# Patient Record
Sex: FEMALE | Race: BLACK OR AFRICAN AMERICAN | NOT HISPANIC OR LATINO | Employment: UNEMPLOYED | RURAL
[De-identification: names, ages, dates, MRNs, and addresses within clinical notes are randomized per-mention and may not be internally consistent; named-entity substitution may affect disease eponyms.]

---

## 2021-04-15 RX ORDER — METFORMIN HYDROCHLORIDE 500 MG/1
500 TABLET ORAL 2 TIMES DAILY WITH MEALS
Qty: 60 TABLET | Refills: 0 | Status: SHIPPED | OUTPATIENT
Start: 2021-04-15 | End: 2022-05-23

## 2021-04-15 RX ORDER — TRIAMTERENE/HYDROCHLOROTHIAZID 37.5-25 MG
1 TABLET ORAL EVERY MORNING
COMMUNITY
End: 2021-04-15 | Stop reason: SDUPTHER

## 2021-04-15 RX ORDER — METFORMIN HYDROCHLORIDE 500 MG/1
500 TABLET ORAL 2 TIMES DAILY WITH MEALS
COMMUNITY
End: 2021-04-15 | Stop reason: SDUPTHER

## 2021-04-15 RX ORDER — TRIAMTERENE/HYDROCHLOROTHIAZID 37.5-25 MG
1 TABLET ORAL EVERY MORNING
Qty: 30 TABLET | Refills: 0 | Status: SHIPPED | OUTPATIENT
Start: 2021-04-15 | End: 2021-04-26 | Stop reason: SDUPTHER

## 2021-04-26 RX ORDER — BENZTROPINE MESYLATE 0.5 MG/1
0.5 TABLET ORAL DAILY
COMMUNITY
Start: 2021-04-08

## 2021-04-26 RX ORDER — TRAZODONE HYDROCHLORIDE 150 MG/1
150 TABLET ORAL NIGHTLY PRN
COMMUNITY
Start: 2021-04-08 | End: 2023-09-18

## 2021-04-26 RX ORDER — LORATADINE 10 MG/1
10 TABLET ORAL DAILY
Qty: 30 TABLET | Refills: 0 | Status: SHIPPED | OUTPATIENT
Start: 2021-04-26 | End: 2021-05-26

## 2021-04-26 RX ORDER — ARIPIPRAZOLE 10 MG/1
10 TABLET ORAL DAILY
COMMUNITY
Start: 2021-04-08

## 2021-04-26 RX ORDER — LORATADINE 10 MG/1
10 TABLET ORAL DAILY
COMMUNITY
Start: 2021-03-14 | End: 2021-04-26 | Stop reason: SDUPTHER

## 2021-04-26 RX ORDER — VORTIOXETINE 10 MG/1
1 TABLET, FILM COATED ORAL DAILY
COMMUNITY
Start: 2021-04-08

## 2021-04-26 RX ORDER — TRIAMTERENE/HYDROCHLOROTHIAZID 37.5-25 MG
1 TABLET ORAL EVERY MORNING
Qty: 30 TABLET | Refills: 0 | Status: SHIPPED | OUTPATIENT
Start: 2021-04-26 | End: 2021-07-06 | Stop reason: SDUPTHER

## 2021-07-06 ENCOUNTER — OFFICE VISIT (OUTPATIENT)
Dept: FAMILY MEDICINE | Facility: CLINIC | Age: 31
End: 2021-07-06
Payer: MEDICAID

## 2021-07-06 VITALS
TEMPERATURE: 98 F | WEIGHT: 293 LBS | OXYGEN SATURATION: 100 % | RESPIRATION RATE: 18 BRPM | HEART RATE: 90 BPM | SYSTOLIC BLOOD PRESSURE: 148 MMHG | DIASTOLIC BLOOD PRESSURE: 101 MMHG | HEIGHT: 64 IN | BODY MASS INDEX: 50.02 KG/M2

## 2021-07-06 DIAGNOSIS — J30.2 SEASONAL ALLERGIES: ICD-10-CM

## 2021-07-06 DIAGNOSIS — N39.0 URINARY TRACT INFECTION WITHOUT HEMATURIA, SITE UNSPECIFIED: ICD-10-CM

## 2021-07-06 DIAGNOSIS — R30.0 DYSURIA: Primary | ICD-10-CM

## 2021-07-06 DIAGNOSIS — I10 ESSENTIAL HYPERTENSION, BENIGN: ICD-10-CM

## 2021-07-06 LAB
BILIRUB SERPL-MCNC: ABNORMAL MG/DL
BLOOD URINE, POC: ABNORMAL
COLOR, POC UA: ABNORMAL
GLUCOSE UR QL STRIP: ABNORMAL
KETONES UR QL STRIP: ABNORMAL
LEUKOCYTE ESTERASE URINE, POC: ABNORMAL
NITRITE, POC UA: ABNORMAL
PH, POC UA: 5.5
PROTEIN, POC: 30
SPECIFIC GRAVITY, POC UA: 1.03
UROBILINOGEN, POC UA: 0.2

## 2021-07-06 PROCEDURE — 99213 PR OFFICE/OUTPT VISIT, EST, LEVL III, 20-29 MIN: ICD-10-PCS | Mod: ,,, | Performed by: NURSE PRACTITIONER

## 2021-07-06 PROCEDURE — 99213 OFFICE O/P EST LOW 20 MIN: CPT | Mod: ,,, | Performed by: NURSE PRACTITIONER

## 2021-07-06 PROCEDURE — 81003 URINALYSIS AUTO W/O SCOPE: CPT | Mod: RHCUB | Performed by: NURSE PRACTITIONER

## 2021-07-06 PROCEDURE — 87086 URINE CULTURE/COLONY COUNT: CPT | Mod: ,,, | Performed by: CLINICAL MEDICAL LABORATORY

## 2021-07-06 PROCEDURE — 87086 CULTURE, URINE: ICD-10-PCS | Mod: ,,, | Performed by: CLINICAL MEDICAL LABORATORY

## 2021-07-06 RX ORDER — PHENAZOPYRIDINE HYDROCHLORIDE 100 MG/1
100 TABLET, FILM COATED ORAL 3 TIMES DAILY PRN
Qty: 10 TABLET | Refills: 0 | Status: SHIPPED | OUTPATIENT
Start: 2021-07-06 | End: 2021-07-16

## 2021-07-06 RX ORDER — LORATADINE 10 MG/1
10 TABLET ORAL DAILY
Qty: 30 TABLET | Refills: 2 | Status: SHIPPED | OUTPATIENT
Start: 2021-07-06 | End: 2024-01-22 | Stop reason: SDUPTHER

## 2021-07-06 RX ORDER — NITROFURANTOIN 25; 75 MG/1; MG/1
100 CAPSULE ORAL 2 TIMES DAILY
Qty: 14 CAPSULE | Refills: 0 | Status: SHIPPED | OUTPATIENT
Start: 2021-07-06 | End: 2023-08-07

## 2021-07-06 RX ORDER — FLUCONAZOLE 150 MG/1
150 TABLET ORAL WEEKLY
Qty: 2 TABLET | Refills: 0 | Status: SHIPPED | OUTPATIENT
Start: 2021-07-06 | End: 2021-07-07

## 2021-07-06 RX ORDER — LORATADINE 10 MG/1
TABLET ORAL
COMMUNITY
Start: 2021-06-02 | End: 2021-07-06 | Stop reason: SDUPTHER

## 2021-07-06 RX ORDER — TRIAMTERENE/HYDROCHLOROTHIAZID 37.5-25 MG
1 TABLET ORAL EVERY MORNING
Qty: 30 TABLET | Refills: 2 | Status: SHIPPED | OUTPATIENT
Start: 2021-07-06 | End: 2022-05-23

## 2021-07-08 LAB — UA COMPLETE W REFLEX CULTURE PNL UR: NORMAL

## 2022-03-08 ENCOUNTER — OFFICE VISIT (OUTPATIENT)
Dept: FAMILY MEDICINE | Facility: CLINIC | Age: 32
End: 2022-03-08
Payer: MEDICAID

## 2022-03-08 VITALS
DIASTOLIC BLOOD PRESSURE: 92 MMHG | WEIGHT: 293 LBS | OXYGEN SATURATION: 97 % | TEMPERATURE: 98 F | HEART RATE: 116 BPM | RESPIRATION RATE: 18 BRPM | HEIGHT: 63 IN | BODY MASS INDEX: 51.91 KG/M2 | SYSTOLIC BLOOD PRESSURE: 141 MMHG

## 2022-03-08 DIAGNOSIS — J03.00 STREP TONSILLITIS: Primary | ICD-10-CM

## 2022-03-08 DIAGNOSIS — J02.9 SORE THROAT: ICD-10-CM

## 2022-03-08 LAB
CTP QC/QA: YES
S PYO RRNA THROAT QL PROBE: NEGATIVE

## 2022-03-08 PROCEDURE — 96372 PR INJECTION,THERAP/PROPH/DIAG2ST, IM OR SUBCUT: ICD-10-PCS | Mod: ,,, | Performed by: NURSE PRACTITIONER

## 2022-03-08 PROCEDURE — 3077F SYST BP >= 140 MM HG: CPT | Mod: CPTII,,, | Performed by: NURSE PRACTITIONER

## 2022-03-08 PROCEDURE — 1160F PR REVIEW ALL MEDS BY PRESCRIBER/CLIN PHARMACIST DOCUMENTED: ICD-10-PCS | Mod: CPTII,,, | Performed by: NURSE PRACTITIONER

## 2022-03-08 PROCEDURE — 99213 PR OFFICE/OUTPT VISIT, EST, LEVL III, 20-29 MIN: ICD-10-PCS | Mod: 25,,, | Performed by: NURSE PRACTITIONER

## 2022-03-08 PROCEDURE — 3008F BODY MASS INDEX DOCD: CPT | Mod: CPTII,,, | Performed by: NURSE PRACTITIONER

## 2022-03-08 PROCEDURE — 3080F PR MOST RECENT DIASTOLIC BLOOD PRESSURE >= 90 MM HG: ICD-10-PCS | Mod: CPTII,,, | Performed by: NURSE PRACTITIONER

## 2022-03-08 PROCEDURE — 3077F PR MOST RECENT SYSTOLIC BLOOD PRESSURE >= 140 MM HG: ICD-10-PCS | Mod: CPTII,,, | Performed by: NURSE PRACTITIONER

## 2022-03-08 PROCEDURE — 99213 OFFICE O/P EST LOW 20 MIN: CPT | Mod: 25,,, | Performed by: NURSE PRACTITIONER

## 2022-03-08 PROCEDURE — 1159F MED LIST DOCD IN RCRD: CPT | Mod: CPTII,,, | Performed by: NURSE PRACTITIONER

## 2022-03-08 PROCEDURE — 3080F DIAST BP >= 90 MM HG: CPT | Mod: CPTII,,, | Performed by: NURSE PRACTITIONER

## 2022-03-08 PROCEDURE — 1159F PR MEDICATION LIST DOCUMENTED IN MEDICAL RECORD: ICD-10-PCS | Mod: CPTII,,, | Performed by: NURSE PRACTITIONER

## 2022-03-08 PROCEDURE — 1160F RVW MEDS BY RX/DR IN RCRD: CPT | Mod: CPTII,,, | Performed by: NURSE PRACTITIONER

## 2022-03-08 PROCEDURE — 3008F PR BODY MASS INDEX (BMI) DOCUMENTED: ICD-10-PCS | Mod: CPTII,,, | Performed by: NURSE PRACTITIONER

## 2022-03-08 PROCEDURE — 87880 STREP A ASSAY W/OPTIC: CPT | Mod: RHCUB | Performed by: NURSE PRACTITIONER

## 2022-03-08 PROCEDURE — 96372 THER/PROPH/DIAG INJ SC/IM: CPT | Mod: ,,, | Performed by: NURSE PRACTITIONER

## 2022-03-08 RX ORDER — CEFTRIAXONE 1 G/1
1 INJECTION, POWDER, FOR SOLUTION INTRAMUSCULAR; INTRAVENOUS
Status: COMPLETED | OUTPATIENT
Start: 2022-03-08 | End: 2022-03-08

## 2022-03-08 RX ORDER — HYDROCODONE BITARTRATE AND ACETAMINOPHEN 10; 325 MG/1; MG/1
1 TABLET ORAL EVERY 6 HOURS PRN
Qty: 12 TABLET | Refills: 0 | Status: SHIPPED | OUTPATIENT
Start: 2022-03-08 | End: 2022-03-20

## 2022-03-08 RX ORDER — KETOROLAC TROMETHAMINE 30 MG/ML
60 INJECTION, SOLUTION INTRAMUSCULAR; INTRAVENOUS
Status: COMPLETED | OUTPATIENT
Start: 2022-03-08 | End: 2022-03-08

## 2022-03-08 RX ORDER — AMOXICILLIN 875 MG/1
875 TABLET, FILM COATED ORAL EVERY 12 HOURS
Qty: 20 TABLET | Refills: 0 | Status: SHIPPED | OUTPATIENT
Start: 2022-03-08 | End: 2023-08-07

## 2022-03-08 RX ADMIN — CEFTRIAXONE 1 G: 1 INJECTION, POWDER, FOR SOLUTION INTRAMUSCULAR; INTRAVENOUS at 09:03

## 2022-03-08 RX ADMIN — KETOROLAC TROMETHAMINE 60 MG: 30 INJECTION, SOLUTION INTRAMUSCULAR; INTRAVENOUS at 09:03

## 2022-03-08 NOTE — PROGRESS NOTES
"Subjective:       Patient ID: Adriana Deleon is a 31 y.o. female.    Chief Complaint: Sore Throat (Sore throat states it is hard to swallow times 2 days)    Presents to clinic as above. No fever. Severe sore throat. Also c/o ear pain.     Review of Systems   Constitutional: Positive for malaise/fatigue. Negative for chills and fever.   HENT: Positive for ear pain and sore throat. Negative for congestion and ear discharge.    Respiratory: Negative.    Cardiovascular: Negative.    Gastrointestinal: Negative.    Genitourinary: Negative.    Musculoskeletal: Negative.    Neurological: Positive for headaches.          Reviewed family, medical, surgical, and social history.    Objective:      BP (!) 141/92   Pulse (!) 116   Temp 98.1 °F (36.7 °C)   Resp 18   Ht 5' 3" (1.6 m)   Wt (!) 161 kg (355 lb)   SpO2 97%   BMI 62.89 kg/m²   Physical Exam  Vitals and nursing note reviewed.   Constitutional:       General: She is not in acute distress.     Appearance: Normal appearance. She is not ill-appearing or diaphoretic.   HENT:      Head: Normocephalic.      Right Ear: Tympanic membrane, ear canal and external ear normal.      Left Ear: Ear canal and external ear normal. Tympanic membrane is erythematous.      Nose: Nose normal.      Mouth/Throat:      Mouth: Mucous membranes are moist.      Pharynx: Uvula midline. Pharyngeal swelling, oropharyngeal exudate and posterior oropharyngeal erythema present. No uvula swelling.      Tonsils: Tonsillar exudate present. 3+ on the right. 3+ on the left.   Cardiovascular:      Rate and Rhythm: Normal rate and regular rhythm.      Pulses: Normal pulses.      Heart sounds: Normal heart sounds.   Pulmonary:      Effort: Pulmonary effort is normal.      Breath sounds: Normal breath sounds.   Musculoskeletal:      Cervical back: Normal range of motion and neck supple.   Skin:     General: Skin is warm and dry.      Capillary Refill: Capillary refill takes less than 2 seconds. " Vital signs stable. Ambulates across room unaided to bathroom to get dressed. Gait steady.   Neurological:      General: No focal deficit present.      Mental Status: She is alert and oriented to person, place, and time.   Psychiatric:         Mood and Affect: Mood normal.         Behavior: Behavior normal.         Thought Content: Thought content normal.         Judgment: Judgment normal.            No visits with results within 1 Day(s) from this visit.   Latest known visit with results is:   Office Visit on 07/06/2021   Component Date Value Ref Range Status    Color, UA 07/06/2021 Dark Yellow   Final    CLARITY CLOUDY    Spec Grav UA 07/06/2021 1.030   Final    pH, UA 07/06/2021 5.5   Final    WBC, UA 07/06/2021 TRACE   Final    Nitrite, UA 07/06/2021 NEG   Final    Protein, POC 07/06/2021 30   Final    Glucose, UA 07/06/2021 NEG   Final    Ketones, UA 07/06/2021 NEG   Final    Bilirubin, POC 07/06/2021 SMALL   Final    Urobilinogen, UA 07/06/2021 0.2   Final    Blood, UA 07/06/2021 TRACE   Final    Culture, Urine 07/06/2021 Skin/Urogenital [place] Isolated, no further workup.   Final      Assessment:       1. Strep tonsillitis    2. Sore throat        Plan:       Strep tonsillitis  -     POCT rapid strep A  -     cefTRIAXone injection 1 g  -     ketorolac injection 60 mg  -     amoxicillin (AMOXIL) 875 MG tablet; Take 1 tablet (875 mg total) by mouth every 12 (twelve) hours.  Dispense: 20 tablet; Refill: 0  -     HYDROcodone-acetaminophen (NORCO)  mg per tablet; Take 1 tablet by mouth every 6 (six) hours as needed for Pain (Tonsilitis pain).  Dispense: 12 tablet; Refill: 0    Sore throat  -     POCT rapid strep A    Drink plenty of fluids  GO to ER with any worsening.  RTC PRN          Risks, benefits, and side effects were discussed with the patient. All questions were answered to the fullest satisfaction of the patient, and pt verbalized understanding and agreement to treatment plan. Pt was to call with any new or worsening symptoms, or present to the ER.

## 2022-05-23 ENCOUNTER — OFFICE VISIT (OUTPATIENT)
Dept: FAMILY MEDICINE | Facility: CLINIC | Age: 32
End: 2022-05-23
Payer: MEDICAID

## 2022-05-23 VITALS
TEMPERATURE: 99 F | WEIGHT: 293 LBS | HEART RATE: 113 BPM | HEIGHT: 63 IN | SYSTOLIC BLOOD PRESSURE: 113 MMHG | RESPIRATION RATE: 19 BRPM | DIASTOLIC BLOOD PRESSURE: 93 MMHG | BODY MASS INDEX: 51.91 KG/M2 | OXYGEN SATURATION: 100 %

## 2022-05-23 DIAGNOSIS — J02.9 SORE THROAT: ICD-10-CM

## 2022-05-23 DIAGNOSIS — J02.9 PHARYNGITIS, UNSPECIFIED ETIOLOGY: Primary | ICD-10-CM

## 2022-05-23 LAB
CTP QC/QA: YES
S PYO RRNA THROAT QL PROBE: NEGATIVE

## 2022-05-23 PROCEDURE — 3008F BODY MASS INDEX DOCD: CPT | Mod: CPTII,,, | Performed by: FAMILY MEDICINE

## 2022-05-23 PROCEDURE — 3008F PR BODY MASS INDEX (BMI) DOCUMENTED: ICD-10-PCS | Mod: CPTII,,, | Performed by: FAMILY MEDICINE

## 2022-05-23 PROCEDURE — 3080F PR MOST RECENT DIASTOLIC BLOOD PRESSURE >= 90 MM HG: ICD-10-PCS | Mod: CPTII,,, | Performed by: FAMILY MEDICINE

## 2022-05-23 PROCEDURE — 3074F PR MOST RECENT SYSTOLIC BLOOD PRESSURE < 130 MM HG: ICD-10-PCS | Mod: CPTII,,, | Performed by: FAMILY MEDICINE

## 2022-05-23 PROCEDURE — 99214 PR OFFICE/OUTPT VISIT, EST, LEVL IV, 30-39 MIN: ICD-10-PCS | Mod: 25,,, | Performed by: FAMILY MEDICINE

## 2022-05-23 PROCEDURE — 1160F RVW MEDS BY RX/DR IN RCRD: CPT | Mod: CPTII,,, | Performed by: FAMILY MEDICINE

## 2022-05-23 PROCEDURE — 1160F PR REVIEW ALL MEDS BY PRESCRIBER/CLIN PHARMACIST DOCUMENTED: ICD-10-PCS | Mod: CPTII,,, | Performed by: FAMILY MEDICINE

## 2022-05-23 PROCEDURE — 96372 THER/PROPH/DIAG INJ SC/IM: CPT | Mod: ,,, | Performed by: FAMILY MEDICINE

## 2022-05-23 PROCEDURE — 3074F SYST BP LT 130 MM HG: CPT | Mod: CPTII,,, | Performed by: FAMILY MEDICINE

## 2022-05-23 PROCEDURE — 99214 OFFICE O/P EST MOD 30 MIN: CPT | Mod: 25,,, | Performed by: FAMILY MEDICINE

## 2022-05-23 PROCEDURE — 1159F MED LIST DOCD IN RCRD: CPT | Mod: CPTII,,, | Performed by: FAMILY MEDICINE

## 2022-05-23 PROCEDURE — 96372 PR INJECTION,THERAP/PROPH/DIAG2ST, IM OR SUBCUT: ICD-10-PCS | Mod: ,,, | Performed by: FAMILY MEDICINE

## 2022-05-23 PROCEDURE — 1159F PR MEDICATION LIST DOCUMENTED IN MEDICAL RECORD: ICD-10-PCS | Mod: CPTII,,, | Performed by: FAMILY MEDICINE

## 2022-05-23 PROCEDURE — 87880 STREP A ASSAY W/OPTIC: CPT | Mod: RHCUB | Performed by: FAMILY MEDICINE

## 2022-05-23 PROCEDURE — 3080F DIAST BP >= 90 MM HG: CPT | Mod: CPTII,,, | Performed by: FAMILY MEDICINE

## 2022-05-23 RX ORDER — DEXAMETHASONE SODIUM PHOSPHATE 4 MG/ML
3 INJECTION, SOLUTION INTRA-ARTICULAR; INTRALESIONAL; INTRAMUSCULAR; INTRAVENOUS; SOFT TISSUE
Status: COMPLETED | OUTPATIENT
Start: 2022-05-23 | End: 2022-05-23

## 2022-05-23 RX ORDER — PREDNISONE 20 MG/1
20 TABLET ORAL DAILY
Qty: 3 TABLET | Refills: 0 | Status: SHIPPED | OUTPATIENT
Start: 2022-05-23 | End: 2022-05-26

## 2022-05-23 RX ORDER — CEFTRIAXONE 1 G/1
1 INJECTION, POWDER, FOR SOLUTION INTRAMUSCULAR; INTRAVENOUS
Status: COMPLETED | OUTPATIENT
Start: 2022-05-23 | End: 2022-05-23

## 2022-05-23 RX ORDER — CLINDAMYCIN HYDROCHLORIDE 300 MG/1
300 CAPSULE ORAL 3 TIMES DAILY
Qty: 21 CAPSULE | Refills: 0 | Status: SHIPPED | OUTPATIENT
Start: 2022-05-23 | End: 2022-05-30

## 2022-05-23 RX ADMIN — CEFTRIAXONE 1 G: 1 INJECTION, POWDER, FOR SOLUTION INTRAMUSCULAR; INTRAVENOUS at 01:05

## 2022-05-23 RX ADMIN — DEXAMETHASONE SODIUM PHOSPHATE 3 MG: 4 INJECTION, SOLUTION INTRA-ARTICULAR; INTRALESIONAL; INTRAMUSCULAR; INTRAVENOUS; SOFT TISSUE at 01:05

## 2022-05-23 NOTE — PROGRESS NOTES
Subjective:       Patient ID: Adriana Deleon is a 31 y.o. female.    Chief Complaint: Sore Throat (Sore throat for a week)    Pt with sore throat, congestion, drainage x 1 week.     Review of Systems   Constitutional: Negative for activity change, appetite change, chills, diaphoresis, fatigue, fever and unexpected weight change.   HENT: Positive for nasal congestion, postnasal drip and sore throat. Negative for dental problem, drooling, ear discharge, ear pain, facial swelling, hearing loss, mouth sores, nosebleeds, rhinorrhea, sinus pressure/congestion, sneezing, tinnitus, trouble swallowing, voice change and goiter.    Eyes: Negative for photophobia, discharge, itching and visual disturbance.   Respiratory: Negative for apnea, cough, choking, chest tightness, shortness of breath, wheezing and stridor.    Cardiovascular: Negative for chest pain, palpitations, leg swelling and claudication.   Gastrointestinal: Negative for abdominal distention, abdominal pain, anal bleeding, blood in stool, change in bowel habit, constipation, diarrhea, nausea, vomiting and change in bowel habit.   Endocrine: Negative for cold intolerance, heat intolerance, polydipsia, polyphagia and polyuria.   Genitourinary: Negative for bladder incontinence, decreased urine volume, difficulty urinating, dysuria, enuresis, flank pain, frequency, hematuria, nocturia, pelvic pain and urgency.   Musculoskeletal: Negative for arthralgias, back pain, gait problem, joint swelling, leg pain, myalgias, neck pain, neck stiffness and joint deformity.   Integumentary:  Negative for pallor, rash, wound, breast mass and breast tenderness.   Allergic/Immunologic: Negative for environmental allergies, food allergies and immunocompromised state.   Neurological: Negative for dizziness, vertigo, tremors, seizures, syncope, facial asymmetry, speech difficulty, weakness, light-headedness, numbness, headaches, disturbances in coordination, memory loss and coordination  difficulties.   Hematological: Negative for adenopathy. Does not bruise/bleed easily.   Psychiatric/Behavioral: Negative for agitation, behavioral problems, confusion, decreased concentration, dysphoric mood, hallucinations, self-injury, sleep disturbance and suicidal ideas. The patient is not nervous/anxious and is not hyperactive.    Breast: Negative for mass and tenderness        Objective:      Physical Exam  Vitals reviewed.   Constitutional:       Appearance: Normal appearance.   HENT:      Head: Normocephalic and atraumatic.      Right Ear: Tympanic membrane, ear canal and external ear normal.      Left Ear: Tympanic membrane, ear canal and external ear normal.      Nose: Nose normal.      Mouth/Throat:      Mouth: Mucous membranes are moist.      Pharynx: Oropharyngeal exudate and posterior oropharyngeal erythema present.   Eyes:      Extraocular Movements: Extraocular movements intact.      Conjunctiva/sclera: Conjunctivae normal.      Pupils: Pupils are equal, round, and reactive to light.   Cardiovascular:      Rate and Rhythm: Normal rate and regular rhythm.      Pulses: Normal pulses.      Heart sounds: Normal heart sounds.   Pulmonary:      Effort: Pulmonary effort is normal.      Breath sounds: Normal breath sounds.   Abdominal:      General: Bowel sounds are normal.      Palpations: Abdomen is soft.   Musculoskeletal:         General: Normal range of motion.      Cervical back: Normal range of motion and neck supple.   Skin:     General: Skin is warm and dry.   Neurological:      General: No focal deficit present.      Mental Status: She is alert. Mental status is at baseline.   Psychiatric:         Mood and Affect: Mood normal.         Behavior: Behavior normal.         Thought Content: Thought content normal.         Judgment: Judgment normal.         Assessment:       1. Pharyngitis, unspecified etiology    2. Sore throat        Plan:     Pharyngitis, unspecified etiology  -     cefTRIAXone  injection 1 g  -     dexamethasone injection 3 mg    Sore throat  -     POCT rapid strep A    Other orders  -     clindamycin (CLEOCIN) 300 MG capsule; Take 1 capsule (300 mg total) by mouth 3 (three) times daily. for 7 days  Dispense: 21 capsule; Refill: 0  -     predniSONE (DELTASONE) 20 MG tablet; Take 1 tablet (20 mg total) by mouth once daily. for 3 days  Dispense: 3 tablet; Refill: 0       Strep negative, treat for nonstrep pharyngitis.

## 2022-12-01 ENCOUNTER — OFFICE VISIT (OUTPATIENT)
Dept: OBSTETRICS AND GYNECOLOGY | Facility: CLINIC | Age: 32
End: 2022-12-01
Payer: MEDICAID

## 2022-12-01 VITALS
HEIGHT: 64 IN | DIASTOLIC BLOOD PRESSURE: 90 MMHG | RESPIRATION RATE: 17 BRPM | BODY MASS INDEX: 50.02 KG/M2 | SYSTOLIC BLOOD PRESSURE: 131 MMHG | HEART RATE: 105 BPM | WEIGHT: 293 LBS | OXYGEN SATURATION: 99 %

## 2022-12-01 DIAGNOSIS — E66.01 MORBID OBESITY WITH BMI OF 60.0-69.9, ADULT: ICD-10-CM

## 2022-12-01 DIAGNOSIS — N93.9 VAGINAL BLEEDING: Primary | ICD-10-CM

## 2022-12-01 DIAGNOSIS — N93.8 DUB (DYSFUNCTIONAL UTERINE BLEEDING): ICD-10-CM

## 2022-12-01 DIAGNOSIS — Z20.2 ENCOUNTER FOR ASSESSMENT OF STD EXPOSURE: ICD-10-CM

## 2022-12-01 DIAGNOSIS — Z32.02 URINE PREGNANCY TEST NEGATIVE: ICD-10-CM

## 2022-12-01 LAB
B-HCG UR QL: NEGATIVE
CANDIDA SPECIES: NEGATIVE
CHLAMYDIA BY PCR: NEGATIVE
CTP QC/QA: YES
GARDNERELLA: POSITIVE
N. GONORRHOEAE (GC) BY PCR: NEGATIVE
TRICHOMONAS: NEGATIVE

## 2022-12-01 PROCEDURE — 1159F PR MEDICATION LIST DOCUMENTED IN MEDICAL RECORD: ICD-10-PCS | Mod: CPTII,,, | Performed by: ADVANCED PRACTICE MIDWIFE

## 2022-12-01 PROCEDURE — 96372 THER/PROPH/DIAG INJ SC/IM: CPT | Mod: ,,, | Performed by: ADVANCED PRACTICE MIDWIFE

## 2022-12-01 PROCEDURE — 87660 TRICHOMONAS VAGIN DIR PROBE: CPT | Mod: ,,, | Performed by: CLINICAL MEDICAL LABORATORY

## 2022-12-01 PROCEDURE — 3008F BODY MASS INDEX DOCD: CPT | Mod: CPTII,,, | Performed by: ADVANCED PRACTICE MIDWIFE

## 2022-12-01 PROCEDURE — 3075F SYST BP GE 130 - 139MM HG: CPT | Mod: CPTII,,, | Performed by: ADVANCED PRACTICE MIDWIFE

## 2022-12-01 PROCEDURE — 85018 HEMOGLOBIN AND HEMATOCRIT, BLOOD: ICD-10-PCS | Mod: ,,, | Performed by: CLINICAL MEDICAL LABORATORY

## 2022-12-01 PROCEDURE — 87491 CHLMYD TRACH DNA AMP PROBE: CPT | Mod: ,,, | Performed by: CLINICAL MEDICAL LABORATORY

## 2022-12-01 PROCEDURE — 3080F DIAST BP >= 90 MM HG: CPT | Mod: CPTII,,, | Performed by: ADVANCED PRACTICE MIDWIFE

## 2022-12-01 PROCEDURE — 85018 HEMOGLOBIN: CPT | Mod: ,,, | Performed by: CLINICAL MEDICAL LABORATORY

## 2022-12-01 PROCEDURE — 87480 CANDIDA DNA DIR PROBE: CPT | Mod: ,,, | Performed by: CLINICAL MEDICAL LABORATORY

## 2022-12-01 PROCEDURE — 87660 BACTERIAL VAGINOSIS: ICD-10-PCS | Mod: ,,, | Performed by: CLINICAL MEDICAL LABORATORY

## 2022-12-01 PROCEDURE — 99202 OFFICE O/P NEW SF 15 MIN: CPT | Mod: 25,,, | Performed by: ADVANCED PRACTICE MIDWIFE

## 2022-12-01 PROCEDURE — 85014 HEMATOCRIT: CPT | Mod: ,,, | Performed by: CLINICAL MEDICAL LABORATORY

## 2022-12-01 PROCEDURE — 85014 HEMOGLOBIN AND HEMATOCRIT, BLOOD: ICD-10-PCS | Mod: ,,, | Performed by: CLINICAL MEDICAL LABORATORY

## 2022-12-01 PROCEDURE — 87510 BACTERIAL VAGINOSIS: ICD-10-PCS | Mod: ,,, | Performed by: CLINICAL MEDICAL LABORATORY

## 2022-12-01 PROCEDURE — 3075F PR MOST RECENT SYSTOLIC BLOOD PRESS GE 130-139MM HG: ICD-10-PCS | Mod: CPTII,,, | Performed by: ADVANCED PRACTICE MIDWIFE

## 2022-12-01 PROCEDURE — 96372 PR INJECTION,THERAP/PROPH/DIAG2ST, IM OR SUBCUT: ICD-10-PCS | Mod: ,,, | Performed by: ADVANCED PRACTICE MIDWIFE

## 2022-12-01 PROCEDURE — 87591 N.GONORRHOEAE DNA AMP PROB: CPT | Mod: ,,, | Performed by: CLINICAL MEDICAL LABORATORY

## 2022-12-01 PROCEDURE — 81025 URINE PREGNANCY TEST: CPT | Mod: RHCUB | Performed by: ADVANCED PRACTICE MIDWIFE

## 2022-12-01 PROCEDURE — 87491 CHLAMYDIA/GONORRHOEAE(GC), PCR: ICD-10-PCS | Mod: ,,, | Performed by: CLINICAL MEDICAL LABORATORY

## 2022-12-01 PROCEDURE — 1159F MED LIST DOCD IN RCRD: CPT | Mod: CPTII,,, | Performed by: ADVANCED PRACTICE MIDWIFE

## 2022-12-01 PROCEDURE — 87591 CHLAMYDIA/GONORRHOEAE(GC), PCR: ICD-10-PCS | Mod: ,,, | Performed by: CLINICAL MEDICAL LABORATORY

## 2022-12-01 PROCEDURE — 99202 PR OFFICE/OUTPT VISIT, NEW, LEVL II, 15-29 MIN: ICD-10-PCS | Mod: 25,,, | Performed by: ADVANCED PRACTICE MIDWIFE

## 2022-12-01 PROCEDURE — 3008F PR BODY MASS INDEX (BMI) DOCUMENTED: ICD-10-PCS | Mod: CPTII,,, | Performed by: ADVANCED PRACTICE MIDWIFE

## 2022-12-01 PROCEDURE — 87480 BACTERIAL VAGINOSIS: ICD-10-PCS | Mod: ,,, | Performed by: CLINICAL MEDICAL LABORATORY

## 2022-12-01 PROCEDURE — 87510 GARDNER VAG DNA DIR PROBE: CPT | Mod: ,,, | Performed by: CLINICAL MEDICAL LABORATORY

## 2022-12-01 PROCEDURE — 3080F PR MOST RECENT DIASTOLIC BLOOD PRESSURE >= 90 MM HG: ICD-10-PCS | Mod: CPTII,,, | Performed by: ADVANCED PRACTICE MIDWIFE

## 2022-12-01 RX ORDER — METFORMIN HYDROCHLORIDE 500 MG/1
1 TABLET ORAL 2 TIMES DAILY
COMMUNITY
End: 2023-08-07 | Stop reason: SDUPTHER

## 2022-12-01 RX ORDER — IBUPROFEN 800 MG/1
800 TABLET ORAL EVERY 8 HOURS PRN
Qty: 60 TABLET | Refills: 2 | Status: SHIPPED | OUTPATIENT
Start: 2022-12-01 | End: 2023-08-07

## 2022-12-01 RX ORDER — HALOPERIDOL 10 MG/1
10 TABLET ORAL 2 TIMES DAILY
COMMUNITY

## 2022-12-01 RX ORDER — TRIAMTERENE/HYDROCHLOROTHIAZID 37.5-25 MG
1 TABLET ORAL DAILY
COMMUNITY

## 2022-12-01 RX ORDER — BUPROPION HYDROCHLORIDE 100 MG/1
100 TABLET, EXTENDED RELEASE ORAL 2 TIMES DAILY
COMMUNITY
Start: 2022-11-18

## 2022-12-01 RX ORDER — MEDROXYPROGESTERONE ACETATE 150 MG/ML
150 INJECTION, SUSPENSION INTRAMUSCULAR ONCE
Status: COMPLETED | OUTPATIENT
Start: 2022-12-01 | End: 2022-12-01

## 2022-12-01 RX ADMIN — MEDROXYPROGESTERONE ACETATE 150 MG: 150 INJECTION, SUSPENSION INTRAMUSCULAR at 11:12

## 2022-12-01 NOTE — PROGRESS NOTES
"Patient ID:  Adriana Deleon is a 31 y.o. female      Chief Complaint:   Chief Complaint   Patient presents with    Vaginal Bleeding     X 4 months with clots        HPI:  Adriana is in with c/o vaginal bleeding x 4 month with passage of clots. [place] [facility] [place], had been without menstrual cycle x 3 months at that time, reports cycle started last week in July with continuous bleeding since. Denies pain. Medical hx updated, takes numerous medications. Discussed hormonal contraceptive options for management of menorrhagia including POP, implant, IUD, and injection. POC discussed and agreed upon for lab, depo injection, and pelvic US.    LMP: No LMP recorded (lmp unknown).   Sexually active:  last encounter more than 1 year past  Contraceptive: none      Past Medical History:   Diagnosis Date    Diabetes mellitus, type 2     NENITA (generalized anxiety disorder)     H/O: depression     Hypertension     Sleep apnea, obstructive      Past Surgical History:   Procedure Laterality Date     SECTION  2017    [place]. [place]aleano[place] [facility] [place]       OB History          1    Para   1    Term                AB        Living   1         SAB        IAB        Ectopic        Multiple        Live Births   1                 BP (!) 131/90 (BP Location: Right arm)   Pulse 105   Resp 17   Ht 5' 4" (1.626 m)   Wt (!) 167.8 kg (370 lb)   LMP  (LMP Unknown)   SpO2 99%   BMI 63.51 kg/m²   Wt Readings from Last 3 Encounters:   22 (!) 167.8 kg (370 lb)   22 (!) 163.3 kg (360 lb)   22 (!) 161 kg (355 lb)          ROS:  Review of Systems   Constitutional: Negative.    Eyes: Negative.    Respiratory: Negative.     Cardiovascular: Negative.    Gastrointestinal: Negative.    Genitourinary:  Positive for menorrhagia and menstrual problem.   Musculoskeletal: Negative.    Neurological: Negative.    Psychiatric/Behavioral: Negative.          PHYSICAL EXAM:  Physical Exam  Constitutional:       Appearance: " Normal appearance. She is obese.   Eyes:      Extraocular Movements: Extraocular movements intact.   Cardiovascular:      Rate and Rhythm: Normal rate.      Pulses: Normal pulses.   Pulmonary:      Effort: Pulmonary effort is normal.   Abdominal:      Palpations: Abdomen is soft.   Musculoskeletal:         General: Normal range of motion.      Cervical back: Normal range of motion.   Skin:     General: Skin is warm and dry.   Neurological:      Mental Status: She is alert and oriented to person, place, and time.   Psychiatric:         Mood and Affect: Mood normal.         Behavior: Behavior normal.         Thought Content: Thought content normal.         Judgment: Judgment normal.        Assessment:  Adriana was seen today for vaginal bleeding.    Diagnoses and all orders for this visit:    Vaginal bleeding  -     POCT urine pregnancy  -     Hemoglobin and Hematocrit; Future  -     Hemoglobin and Hematocrit    DUB (dysfunctional uterine bleeding)  -     Hemoglobin and Hematocrit; Future  -     ibuprofen (ADVIL,MOTRIN) 800 MG tablet; Take 1 tablet (800 mg total) by mouth every 8 (eight) hours as needed for Pain. Take 1 every 8 hours for 7 days to manage bleeding.  -     US Pelvis Complete Non OB; Future  -     Bacterial Vaginosis; Future  -     Chlamydia/GC, PCR; Future  -     medroxyPROGESTERone (DEPO-PROVERA) syringe 150 mg  -     Bacterial Vaginosis  -     Chlamydia/GC, PCR    Encounter for assessment of STD exposure  -     Bacterial Vaginosis; Future  -     Chlamydia/GC, PCR; Future  -     Bacterial Vaginosis  -     Chlamydia/GC, PCR    Morbid obesity with BMI of 60.0-69.9, adult    Urine pregnancy test negative        ICD-10-CM ICD-9-CM    1. Vaginal bleeding  N93.9 623.8 POCT urine pregnancy      Hemoglobin and Hematocrit      Hemoglobin and Hematocrit      2. DUB (dysfunctional uterine bleeding)  N93.8 626.8 Hemoglobin and Hematocrit      ibuprofen (ADVIL,MOTRIN) 800 MG tablet      US Pelvis Complete Non OB       Bacterial Vaginosis      Chlamydia/GC, PCR      medroxyPROGESTERone (DEPO-PROVERA) syringe 150 mg      Bacterial Vaginosis      Chlamydia/GC, PCR      3. Encounter for assessment of STD exposure  Z76.89 V72.85 Bacterial Vaginosis      Chlamydia/GC, PCR      Bacterial Vaginosis      Chlamydia/GC, PCR      4. Morbid obesity with BMI of 60.0-69.9, adult  E66.01 278.01     Z68.44 V85.44       5. Urine pregnancy test negative  Z32.02 V72.41           Plan:  UPT negative  Affirm swab self collected  Urine sent for GC and chlamydia testing  Will call with results, encouraged use of My Chart for review of labs  Discussed management of menorrhagia with hormonal contraceptive and NSAIDs, printed info given and reviewed  Depo injection 150 mg given IM, reviewed r/b and possible s/e, printed fact sheet given  RX sent for Ibuprofen, instructed on use  Pelvic US ordered and scheduled    Follow up in about 3 months (around 3/1/2023) for repeat depo injection.

## 2022-12-02 ENCOUNTER — TELEPHONE (OUTPATIENT)
Dept: OBSTETRICS AND GYNECOLOGY | Facility: CLINIC | Age: 32
End: 2022-12-02
Payer: MEDICAID

## 2022-12-02 DIAGNOSIS — N76.0 BACTERIAL VAGINAL INFECTION: Primary | ICD-10-CM

## 2022-12-02 DIAGNOSIS — B96.89 BACTERIAL VAGINAL INFECTION: Primary | ICD-10-CM

## 2022-12-02 PROBLEM — E66.01 MORBID OBESITY WITH BMI OF 60.0-69.9, ADULT: Status: ACTIVE | Noted: 2022-12-02

## 2022-12-02 PROBLEM — N93.8 DUB (DYSFUNCTIONAL UTERINE BLEEDING): Status: ACTIVE | Noted: 2022-12-02

## 2022-12-02 RX ORDER — METRONIDAZOLE 500 MG/1
500 TABLET ORAL 2 TIMES DAILY
Qty: 14 TABLET | Refills: 0 | Status: SHIPPED | OUTPATIENT
Start: 2022-12-02 | End: 2022-12-09

## 2022-12-02 RX ORDER — FLUCONAZOLE 150 MG/1
150 TABLET ORAL
Qty: 2 TABLET | Refills: 0 | Status: SHIPPED | OUTPATIENT
Start: 2022-12-02 | End: 2022-12-10

## 2022-12-02 NOTE — PROGRESS NOTES
Advise she tested positive for  bacterial vaginosis which is not an STD. I have sent prescriptions to her pharmacy for Flagyl and Diflucan. If sexually active, no sex until antibiotics completed. Review negative STD lab results.

## 2022-12-02 NOTE — TELEPHONE ENCOUNTER
----- Message from Corinne Mercado CNM sent at 12/2/2022 11:07 AM CST -----  Advise she tested positive for  bacterial vaginosis which is not an STD. I have sent prescriptions to her pharmacy for Flagyl and Diflucan. If sexually active, no sex until antibiotics completed. Review negative STD lab results.

## 2022-12-02 NOTE — TELEPHONE ENCOUNTER
----- Message from Janusz Morrison sent at 12/2/2022 12:26 PM CST -----  Regarding: Patient is needing to talk to a nurse/ midwife about the dosage of medicine  Patient wanted to find out the dosage of the Ibuprofen that she recommended for her.  Please call [phone]

## 2022-12-05 ENCOUNTER — TELEPHONE (OUTPATIENT)
Dept: OBSTETRICS AND GYNECOLOGY | Facility: CLINIC | Age: 32
End: 2022-12-05
Payer: MEDICAID

## 2022-12-05 LAB
HCT VFR BLD AUTO: 38.4 % (ref 38–47)
HGB BLD-MCNC: 12.4 G/DL (ref 12–16)

## 2022-12-05 NOTE — TELEPHONE ENCOUNTER
----- Message from Shanti Garvey CNM sent at 12/5/2022  2:35 PM CST -----  Regarding: FW: Patient is needing to talk to a nurse/ midwife about the dosage of medicine  Tell her she can take  800 mg every 8 hours if needed, up to 3 days.  ----- Message -----  From: Janusz Morrison  Sent: 12/2/2022  12:33 PM CST  To: Shanti Garvey CNM, #  Subject: Patient is needing to talk to a nurse/ midwi#    Patient wanted to find out the dosage of the Ibuprofen that she recommended for her.  Please call [phone]

## 2022-12-07 ENCOUNTER — TELEPHONE (OUTPATIENT)
Dept: OBSTETRICS AND GYNECOLOGY | Facility: CLINIC | Age: 32
End: 2022-12-07
Payer: MEDICAID

## 2022-12-07 NOTE — TELEPHONE ENCOUNTER
----- Message from Corinne Mercado CNM sent at 12/5/2022  6:24 PM CST -----  Review H&H, let her know it is normal.

## 2022-12-09 ENCOUNTER — HOSPITAL ENCOUNTER (OUTPATIENT)
Dept: RADIOLOGY | Facility: HOSPITAL | Age: 32
Discharge: HOME | End: 2022-12-09
Attending: ADVANCED PRACTICE MIDWIFE
Payer: MEDICAID

## 2022-12-09 DIAGNOSIS — N93.8 DUB (DYSFUNCTIONAL UTERINE BLEEDING): ICD-10-CM

## 2022-12-09 PROCEDURE — 76856 US PELVIS COMPLETE NON OB: ICD-10-PCS | Mod: 26,,, | Performed by: STUDENT IN AN ORGANIZED HEALTH CARE EDUCATION/TRAINING PROGRAM

## 2022-12-09 PROCEDURE — 76856 US EXAM PELVIC COMPLETE: CPT | Mod: 26,,, | Performed by: STUDENT IN AN ORGANIZED HEALTH CARE EDUCATION/TRAINING PROGRAM

## 2022-12-09 PROCEDURE — 76856 US EXAM PELVIC COMPLETE: CPT | Mod: TC

## 2022-12-19 ENCOUNTER — PATIENT MESSAGE (OUTPATIENT)
Dept: OBSTETRICS AND GYNECOLOGY | Facility: CLINIC | Age: 32
End: 2022-12-19
Payer: MEDICAID

## 2022-12-20 DIAGNOSIS — N93.8 DUB (DYSFUNCTIONAL UTERINE BLEEDING): Primary | ICD-10-CM

## 2022-12-20 RX ORDER — MEDROXYPROGESTERONE ACETATE 10 MG/1
10 TABLET ORAL DAILY
Qty: 10 TABLET | Refills: 1 | Status: SHIPPED | OUTPATIENT
Start: 2022-12-20 | End: 2023-02-21

## 2022-12-27 ENCOUNTER — PATIENT MESSAGE (OUTPATIENT)
Dept: OBSTETRICS AND GYNECOLOGY | Facility: CLINIC | Age: 32
End: 2022-12-27
Payer: MEDICAID

## 2022-12-27 ENCOUNTER — TELEPHONE (OUTPATIENT)
Dept: OBSTETRICS AND GYNECOLOGY | Facility: CLINIC | Age: 32
End: 2022-12-27
Payer: MEDICAID

## 2022-12-27 DIAGNOSIS — R10.2 PELVIC PAIN IN FEMALE: ICD-10-CM

## 2022-12-27 DIAGNOSIS — N93.8 DUB (DYSFUNCTIONAL UTERINE BLEEDING): Primary | ICD-10-CM

## 2022-12-27 NOTE — TELEPHONE ENCOUNTER
"Call placed to Adriana Deleon after My Chart message. Instructed to go into ER for management of "excruciating" abd pain not relieved by Ibuprofen given at appointment. Discussed normal pelvic US findings, pain could be R/T other issues such as gallbladder, appendix etc. She declines ER visit. Instructed may take Ibuprofen every 6 hours with food. Discussed and agreed to gyn consult for possible endometriosis evaluation. Will send order. Reinforced need to go into ER if pain continues.   "

## 2022-12-31 ENCOUNTER — HOSPITAL ENCOUNTER (EMERGENCY)
Facility: HOSPITAL | Age: 32
Discharge: HOME | End: 2022-12-31
Payer: MEDICAID

## 2022-12-31 ENCOUNTER — NURSE TRIAGE (OUTPATIENT)
Dept: ADMINISTRATIVE | Facility: CLINIC | Age: 32
End: 2022-12-31

## 2022-12-31 VITALS
HEART RATE: 105 BPM | DIASTOLIC BLOOD PRESSURE: 81 MMHG | OXYGEN SATURATION: 100 % | RESPIRATION RATE: 18 BRPM | TEMPERATURE: 99 F | HEIGHT: 64 IN | SYSTOLIC BLOOD PRESSURE: 177 MMHG | WEIGHT: 293 LBS | BODY MASS INDEX: 50.02 KG/M2

## 2022-12-31 DIAGNOSIS — N93.8 DUB (DYSFUNCTIONAL UTERINE BLEEDING): Primary | ICD-10-CM

## 2022-12-31 LAB
ABORH RETYPE: NORMAL
ALBUMIN SERPL BCP-MCNC: 3.4 G/DL (ref 3.5–5)
ALBUMIN/GLOB SERPL: 0.7 {RATIO}
ALP SERPL-CCNC: 84 U/L (ref 37–98)
ALT SERPL W P-5'-P-CCNC: 25 U/L (ref 13–56)
ANION GAP SERPL CALCULATED.3IONS-SCNC: 12 MMOL/L (ref 7–16)
APTT PPP: 25.5 SECONDS (ref 25.2–37.3)
AST SERPL W P-5'-P-CCNC: 19 U/L (ref 15–37)
B-HCG UR QL: NEGATIVE
BASOPHILS # BLD AUTO: 0.06 K/UL (ref 0–0.2)
BASOPHILS NFR BLD AUTO: 0.6 % (ref 0–1)
BILIRUB SERPL-MCNC: 0.3 MG/DL (ref ?–1.2)
BUN SERPL-MCNC: 14 MG/DL (ref 7–18)
BUN/CREAT SERPL: 19 (ref 6–20)
CALCIUM SERPL-MCNC: 9.5 MG/DL (ref 8.5–10.1)
CHLORIDE SERPL-SCNC: 105 MMOL/L (ref 98–107)
CO2 SERPL-SCNC: 28 MMOL/L (ref 21–32)
CREAT SERPL-MCNC: 0.73 MG/DL (ref 0.55–1.02)
CTP QC/QA: YES
DIFFERENTIAL METHOD BLD: ABNORMAL
EGFR (NO RACE VARIABLE) (RUSH/TITUS): 114 ML/MIN/1.73M²
EOSINOPHIL # BLD AUTO: 0.19 K/UL (ref 0–0.5)
EOSINOPHIL NFR BLD AUTO: 2 % (ref 1–4)
ERYTHROCYTE [DISTWIDTH] IN BLOOD BY AUTOMATED COUNT: 15 % (ref 11.5–14.5)
GLOBULIN SER-MCNC: 4.6 G/DL (ref 2–4)
GLUCOSE SERPL-MCNC: 100 MG/DL (ref 74–106)
HCT VFR BLD AUTO: 35.2 % (ref 38–47)
HGB BLD-MCNC: 11.2 G/DL (ref 12–16)
IMM GRANULOCYTES # BLD AUTO: 0.02 K/UL (ref 0–0.04)
IMM GRANULOCYTES NFR BLD: 0.2 % (ref 0–0.4)
INDIRECT COOMBS: NORMAL
INR BLD: 1.05
LYMPHOCYTES # BLD AUTO: 3.62 K/UL (ref 1–4.8)
LYMPHOCYTES NFR BLD AUTO: 38 % (ref 27–41)
MCH RBC QN AUTO: 25.8 PG (ref 27–31)
MCHC RBC AUTO-ENTMCNC: 31.8 G/DL (ref 32–36)
MCV RBC AUTO: 81.1 FL (ref 80–96)
MONOCYTES # BLD AUTO: 0.57 K/UL (ref 0–0.8)
MONOCYTES NFR BLD AUTO: 6 % (ref 2–6)
MPC BLD CALC-MCNC: 9.5 FL (ref 9.4–12.4)
NEUTROPHILS # BLD AUTO: 5.06 K/UL (ref 1.8–7.7)
NEUTROPHILS NFR BLD AUTO: 53.2 % (ref 53–65)
NRBC # BLD AUTO: 0 X10E3/UL
NRBC, AUTO (.00): 0 %
PLATELET # BLD AUTO: 417 K/UL (ref 150–400)
POTASSIUM SERPL-SCNC: 3.8 MMOL/L (ref 3.5–5.1)
PROT SERPL-MCNC: 8 G/DL (ref 6.4–8.2)
PROTHROMBIN TIME: 13.3 SECONDS (ref 11.7–14.7)
RBC # BLD AUTO: 4.34 M/UL (ref 4.2–5.4)
RH BLD: NORMAL
SODIUM SERPL-SCNC: 141 MMOL/L (ref 136–145)
WBC # BLD AUTO: 9.52 K/UL (ref 4.5–11)

## 2022-12-31 PROCEDURE — 80053 COMPREHEN METABOLIC PANEL: CPT | Performed by: NURSE PRACTITIONER

## 2022-12-31 PROCEDURE — 99284 EMERGENCY DEPT VISIT MOD MDM: CPT

## 2022-12-31 PROCEDURE — 99284 EMERGENCY DEPT VISIT MOD MDM: CPT | Mod: ,,, | Performed by: NURSE PRACTITIONER

## 2022-12-31 PROCEDURE — 36415 COLL VENOUS BLD VENIPUNCTURE: CPT | Performed by: NURSE PRACTITIONER

## 2022-12-31 PROCEDURE — 99284 PR EMERGENCY DEPT VISIT,LEVEL IV: ICD-10-PCS | Mod: ,,, | Performed by: NURSE PRACTITIONER

## 2022-12-31 PROCEDURE — 63600175 PHARM REV CODE 636 W HCPCS: Performed by: NURSE PRACTITIONER

## 2022-12-31 PROCEDURE — 85025 COMPLETE CBC W/AUTO DIFF WBC: CPT | Performed by: NURSE PRACTITIONER

## 2022-12-31 PROCEDURE — 85730 THROMBOPLASTIN TIME PARTIAL: CPT | Performed by: NURSE PRACTITIONER

## 2022-12-31 PROCEDURE — 85610 PROTHROMBIN TIME: CPT | Performed by: NURSE PRACTITIONER

## 2022-12-31 PROCEDURE — 81025 URINE PREGNANCY TEST: CPT | Performed by: NURSE PRACTITIONER

## 2022-12-31 PROCEDURE — 96372 THER/PROPH/DIAG INJ SC/IM: CPT | Performed by: NURSE PRACTITIONER

## 2022-12-31 PROCEDURE — 86900 BLOOD TYPING SEROLOGIC ABO: CPT | Performed by: NURSE PRACTITIONER

## 2022-12-31 RX ORDER — FERROUS SULFATE 325(65) MG
325 TABLET, DELAYED RELEASE (ENTERIC COATED) ORAL 2 TIMES DAILY
Qty: 60 TABLET | Refills: 0 | Status: SHIPPED | OUTPATIENT
Start: 2022-12-31 | End: 2023-08-14 | Stop reason: SDUPTHER

## 2022-12-31 RX ORDER — KETOROLAC TROMETHAMINE 30 MG/ML
30 INJECTION, SOLUTION INTRAMUSCULAR; INTRAVENOUS
Status: COMPLETED | OUTPATIENT
Start: 2022-12-31 | End: 2022-12-31

## 2022-12-31 RX ADMIN — KETOROLAC TROMETHAMINE 30 MG: 30 INJECTION, SOLUTION INTRAMUSCULAR; INTRAVENOUS at 08:12

## 2022-12-31 NOTE — TELEPHONE ENCOUNTER
Reason for Disposition   SEVERE abdominal pain    Additional Information   Negative: Shock suspected (e.g., cold/pale/clammy skin, too weak to stand, low BP, rapid pulse)   Negative: Difficult to awaken or acting confused (e.g., disoriented, slurred speech)   Negative: Passed out (i.e., lost consciousness, collapsed and was not responding)   Negative: Sounds like a life-threatening emergency to the triager   Negative: SEVERE dizziness (e.g., unable to stand, requires support to walk, feels like passing out now)    Protocols used: Vaginal Bleeding - [id number]    Adriana called to say she is still having uterine bleeding, and is constant x 5 mos, passing very large clots.  She said she spoke with her OB, Corinne Mercado MD with [facility] in [place].  MD put her on ibuprofen every 6 hours, and her pain is 6 / 10 if lying down, but if she gets up, the pain is 10/10. Said she was told to take ibuprofen 800 mg every 8 hours, but was not working so she was changed to 800 mg every 6 hours.  Has been taking it on this schedule and is not helping, per Adriana. She is staying in bed constantly, she said, only getting up every 3-4 hours to use the bathroom. States she is feeling very tired, weak. Said she was using 3 pads an hour since July, but has recently begun using adult diapers.  Now using 1 adult diaper every 2 hours. Of note, she was advised to go to ED for excruciating abdominal pain by Corinne Mercado NP in OBGYN in [place] ([facility]) on 12/27/2022 but she has not gone yet.  Per [facility] triage protocol, recommend ED now for evaluation.  She states she will go now, and has another adult who can drive her there.  Message to DENNIS Mercado and Niesha Machado MD PCP in [place].  Please contact caller directly with any additional care advice.

## 2022-12-31 NOTE — ED TRIAGE NOTES
Pt presents to e with c/o having abdominal pain and pelvic pain that has become worse since the 18th. States she has been bleeding for 5 months and passing massive blood clots as well

## 2023-01-01 NOTE — DISCHARGE INSTRUCTIONS
Take prescriptions as directed. Ensure you are drinking plenty of fluids. Alternate tylenol and ibuprofen. Continue medications as previously directed by OB/GYN. Call Monday and make an appointment to be seen for continued care and management. Return to the ED for worsening signs and symptoms or otherwise as needed.

## 2023-01-01 NOTE — ED PROVIDER NOTES
Encounter Date: 2022       History     Chief Complaint   Patient presents with    Abdominal Pain    Vaginal Bleeding     31 y/o AAF with PMH of DM, NENITA, Depression, HTN and DUB presents to the ED with c/o heavy vaginal bleeding and abd cramping for the past 5 months. Had an US earlier this month and was started on routine Ibuprofen as well as Provera. She had Depo shot and Just completed 10 day course of provera and reports her bleeding has not improved. She reports bleeding through pads every 2-3 hours. She denies dizziness, chest pain, shortness of breath, nausea, vomiting, palpitations.     The history is provided by the patient and medical records.   Review of patient's allergies indicates:  No Known Allergies  Past Medical History:   Diagnosis Date    Diabetes mellitus, type 2     NENITA (generalized anxiety disorder)     H/O: depression     Hypertension     Sleep apnea, obstructive      Past Surgical History:   Procedure Laterality Date     [place] [facility] [place]     History reviewed. No pertinent family history.  Social History     Tobacco Use    Smoking status: Never    Smokeless tobacco: Never   Substance Use Topics    Alcohol use: Never    Drug use: Never     Review of Systems   Constitutional:  Negative for chills, fatigue and fever.   Respiratory:  Negative for shortness of breath.    Cardiovascular:  Negative for chest pain and palpitations.   Genitourinary:  Positive for menstrual problem.   Neurological:  Negative for dizziness, syncope, weakness and light-headedness.   All other systems reviewed and are negative.    Physical Exam     Initial Vitals [22 1732]   BP Pulse Resp Temp SpO2   (!) 177/81 105 18 99.4 °F (37.4 °C) 100 %      MAP       --         Physical Exam    Constitutional: She appears well-developed and well-nourished. She is Obese . She is cooperative.   Cardiovascular:  Normal rate, regular rhythm, normal heart sounds and normal pulses.            Pulmonary/Chest: Effort normal and breath sounds normal.   Abdominal: Abdomen is soft. Bowel sounds are normal. There is no abdominal tenderness.   Genitourinary:    Pelvic exam was performed with patient supine.   Uterus is enlarged. Uterus is not tender. Cervix exhibits no motion tenderness. Right adnexum displays no tenderness and no fullness. Left adnexum displays no tenderness and no fullness.    Vaginal bleeding present.   There is bleeding in the vagina.     Neurological: She is alert and oriented to person, place, and time.   Skin: Skin is warm, dry and intact. Capillary refill takes less than 2 seconds.   Psychiatric: She has a normal mood and affect. Her speech is normal and behavior is normal. Judgment and thought content normal. Cognition and memory are normal.       Medical Screening Exam   See Full Note    ED Course   Procedures  Labs Reviewed   COMPREHENSIVE METABOLIC PANEL - Abnormal; Notable for the following components:       Result Value    Albumin 3.4 (*)     Globulin 4.6 (*)     All other components within normal limits   CBC WITH DIFFERENTIAL - Abnormal; Notable for the following components:    Hemoglobin 11.2 (*)     Hematocrit 35.2 (*)     MCH 25.8 (*)     MCHC 31.8 (*)     RDW 15.0 (*)     Platelet Count 417 (*)     All other components within normal limits   PROTIME-INR - Normal   APTT - Normal   CBC W/ AUTO DIFFERENTIAL    Narrative:     The following orders were created for panel order CBC auto differential.  Procedure                               Abnormality         Status                     ---------                               -----------         ------                     CBC with Differential[[id number]]        Abnormal            Final result                 Please view results for these tests on the individual orders.   POCT URINE PREGNANCY   TYPE & SCREEN   ABORH RETYPE          Imaging Results    None          Medications   ketorolac injection 30 mg (30 mg Intramuscular  Given 12/31/22 2025)                Counseled on supportive measures. Warning s/s discussed and return precautions given; the patient has v/u.         Clinical Impression:   Final diagnoses:  [N93.8] DUB (dysfunctional uterine bleeding) (Primary)        ED Disposition Condition    Discharge Stable          ED Prescriptions       Medication Sig Dispense Start Date End Date Auth. Provider    ferrous sulfate 325 (65 FE) MG EC tablet Take 1 tablet (325 mg total) by mouth 2 (two) times daily. 60 tablet 12/31/2022 -- SAMMIE Mcknight          Follow-up Information       Follow up With Specialties Details Why Contact Info    OB/GYN  Call in 2 days               SAMMIE Mcknight  12/31/22 2225       SAMMIE Mcknight  12/31/22 2226

## 2023-02-08 ENCOUNTER — PATIENT MESSAGE (OUTPATIENT)
Dept: OBSTETRICS AND GYNECOLOGY | Facility: CLINIC | Age: 33
End: 2023-02-08
Payer: MEDICAID

## 2023-02-21 ENCOUNTER — OFFICE VISIT (OUTPATIENT)
Dept: OBSTETRICS AND GYNECOLOGY | Facility: CLINIC | Age: 33
End: 2023-02-21
Payer: MEDICAID

## 2023-02-21 VITALS
RESPIRATION RATE: 17 BRPM | HEIGHT: 64 IN | BODY MASS INDEX: 50.02 KG/M2 | DIASTOLIC BLOOD PRESSURE: 83 MMHG | SYSTOLIC BLOOD PRESSURE: 155 MMHG | OXYGEN SATURATION: 99 % | WEIGHT: 293 LBS | HEART RATE: 110 BPM | TEMPERATURE: 99 F

## 2023-02-21 DIAGNOSIS — Z30.42 SURVEILLANCE OF CONTRACEPTIVE INJECTION: Primary | ICD-10-CM

## 2023-02-21 DIAGNOSIS — E66.01 MORBID OBESITY WITH BMI OF 60.0-69.9, ADULT: ICD-10-CM

## 2023-02-21 DIAGNOSIS — N92.1 BREAKTHROUGH BLEEDING ON DEPO-PROVERA: ICD-10-CM

## 2023-02-21 PROCEDURE — 3008F BODY MASS INDEX DOCD: CPT | Mod: CPTII,,, | Performed by: ADVANCED PRACTICE MIDWIFE

## 2023-02-21 PROCEDURE — 99213 PR OFFICE/OUTPT VISIT, EST, LEVL III, 20-29 MIN: ICD-10-PCS | Mod: 25,,, | Performed by: ADVANCED PRACTICE MIDWIFE

## 2023-02-21 PROCEDURE — 1159F PR MEDICATION LIST DOCUMENTED IN MEDICAL RECORD: ICD-10-PCS | Mod: CPTII,,, | Performed by: ADVANCED PRACTICE MIDWIFE

## 2023-02-21 PROCEDURE — 1159F MED LIST DOCD IN RCRD: CPT | Mod: CPTII,,, | Performed by: ADVANCED PRACTICE MIDWIFE

## 2023-02-21 PROCEDURE — 3079F DIAST BP 80-89 MM HG: CPT | Mod: CPTII,,, | Performed by: ADVANCED PRACTICE MIDWIFE

## 2023-02-21 PROCEDURE — 3079F PR MOST RECENT DIASTOLIC BLOOD PRESSURE 80-89 MM HG: ICD-10-PCS | Mod: CPTII,,, | Performed by: ADVANCED PRACTICE MIDWIFE

## 2023-02-21 PROCEDURE — 96372 THER/PROPH/DIAG INJ SC/IM: CPT | Mod: ,,, | Performed by: ADVANCED PRACTICE MIDWIFE

## 2023-02-21 PROCEDURE — 3077F PR MOST RECENT SYSTOLIC BLOOD PRESSURE >= 140 MM HG: ICD-10-PCS | Mod: CPTII,,, | Performed by: ADVANCED PRACTICE MIDWIFE

## 2023-02-21 PROCEDURE — 3077F SYST BP >= 140 MM HG: CPT | Mod: CPTII,,, | Performed by: ADVANCED PRACTICE MIDWIFE

## 2023-02-21 PROCEDURE — 99213 OFFICE O/P EST LOW 20 MIN: CPT | Mod: 25,,, | Performed by: ADVANCED PRACTICE MIDWIFE

## 2023-02-21 PROCEDURE — 3008F PR BODY MASS INDEX (BMI) DOCUMENTED: ICD-10-PCS | Mod: CPTII,,, | Performed by: ADVANCED PRACTICE MIDWIFE

## 2023-02-21 PROCEDURE — 96372 PR INJECTION,THERAP/PROPH/DIAG2ST, IM OR SUBCUT: ICD-10-PCS | Mod: ,,, | Performed by: ADVANCED PRACTICE MIDWIFE

## 2023-02-21 RX ORDER — MEDROXYPROGESTERONE ACETATE 150 MG/ML
150 INJECTION, SUSPENSION INTRAMUSCULAR
Status: COMPLETED | OUTPATIENT
Start: 2023-02-21 | End: 2023-02-21

## 2023-02-21 RX ORDER — PIOGLITAZONEHYDROCHLORIDE 15 MG/1
15 TABLET ORAL DAILY
COMMUNITY
Start: 2023-02-02 | End: 2023-08-07 | Stop reason: SDUPTHER

## 2023-02-21 RX ADMIN — MEDROXYPROGESTERONE ACETATE 150 MG: 150 INJECTION, SUSPENSION INTRAMUSCULAR at 11:02

## 2023-02-21 NOTE — PROGRESS NOTES
"  Patient ID:  Adriana Deleon is a 32 y.o. female      Chief Complaint:   Chief Complaint   Patient presents with    Contraception     Depo        HPI:  Adriana Deleon is in for repeat depo injection within dates. Started depo in 2022 for c/o prolonged menstrual bleeding and menorrhagia. Continues to have episodes of prolonged bleeding. Was scheduled an appointment with Dr Crowder 23, canceled appt and did not rescheduled. Had an appt scheduled with Dr. Odom 23, canceled appt and did not rescheduled.  Denies ACHES and vag discharge. Desires to continue depo injections at this time, does not want gyn consult rescheduled. Was given ocp for management of BTB 23.   LMP: No LMP recorded.  Sexually active: no    Past Medical History:   Diagnosis Date    Diabetes mellitus, type 2     NENITA (generalized anxiety disorder)     H/O: depression     Hypertension     Sleep apnea, obstructive        Past Surgical History:   Pro[place] Laterality Date     [place] [facility] [place]          1    Para   1    Term                AB        Living   1         SAB        IAB        Ectopic        Multiple        Live Births   1                 BP (!) 155/83 (BP Location: Right arm)   Pulse 110   Temp 98.9 °F (37.2 °C)   Resp 17   Ht 5' 4" (1.626 m)   Wt (!) 170.4 kg (375 lb 9.6 oz)   SpO2 99%   BMI 64.47 kg/m²   Wt Readings from Last 3 Encounters:   23 (!) 170.4 kg (375 lb 9.6 oz)   22 (!) 169.6 kg (374 lb)   22 (!) 167.8 kg (370 lb)          ROS:  Review of Systems   Constitutional: Negative.    Eyes: Negative.    Respiratory: Negative.     Cardiovascular: Negative.    Gastrointestinal: Negative.    Genitourinary:  Positive for menstrual problem.   Musculoskeletal: Negative.    Neurological: Negative.    Psychiatric/Behavioral: Negative.          PHYSICAL EXAM:  Physical Exam  Constitutional:       Appearance: Normal appearance. She is " "obese.   Eyes:      Extraocular Movements: Extraocular movements intact.   Cardiovascular:      Rate and Rhythm: Normal rate.      Pulses: Normal pulses.   Pulmonary:      Effort: Pulmonary effort is normal.   Abdominal:      Palpations: Abdomen is soft.   Musculoskeletal:         General: Normal range of motion.      Cervical back: Normal range of motion.   Skin:     General: Skin is warm and dry.   Neurological:      Mental Status: She is alert and oriented to person, place, and time.   Psychiatric:         Mood and Affect: Mood normal.         Behavior: Behavior normal.         Thought Content: Thought content normal.         Judgment: Judgment normal.        Assessment:  Surveillance of contraceptive injection  -     medroxyPROGESTERone (DEPO-PROVERA) injection 150 mg    Breakthrough bleeding on Depo-Provera    Morbid obesity with BMI of 60.0-69.9, adult          ICD-10-CM ICD-9-CM    1. Surveillance of contraceptive injection  Z30.42 V25.49 medroxyPROGESTERone (DEPO-PROVERA) injection 150 mg      2. Breakthrough bleeding on Depo-Provera  N92.1 626.6       3. Morbid obesity with BMI of 60.0-69.9, adult  E66.01 278.01     Z68.44 V85.44           Plan:  Depo Provera 150 mg IM given, Reviewed "ACHES" of contraceptives and possible S/E  Encouraged Vitamin D and calcium supplements or daily multivitamin  Instructed on condom use during each sexual encounter to decrease STD exposure risk  Discussed gyn consult if bleeding continues and desires follow up    Follow up in about 3 months (around 5/21/2023) for repeat depo injection.                    "

## 2023-03-15 DIAGNOSIS — N92.1 BREAKTHROUGH BLEEDING ON DEPO-PROVERA: Primary | ICD-10-CM

## 2023-03-15 DIAGNOSIS — N93.8 DUB (DYSFUNCTIONAL UTERINE BLEEDING): ICD-10-CM

## 2023-03-15 RX ORDER — ESTRADIOL 2 MG/1
2 TABLET ORAL DAILY
Qty: 7 TABLET | Refills: 0 | Status: SHIPPED | OUTPATIENT
Start: 2023-03-15 | End: 2023-07-10 | Stop reason: ALTCHOICE

## 2023-03-15 RX ORDER — IBUPROFEN 800 MG/1
800 TABLET ORAL EVERY 8 HOURS PRN
Qty: 60 TABLET | Refills: 2 | Status: CANCELLED | OUTPATIENT
Start: 2023-03-15 | End: 2024-03-14

## 2023-03-15 NOTE — TELEPHONE ENCOUNTER
Stated name and , informed her since she has had 180 Ibuprofen in 2-3 months we can not refill it at this time. She stated she was taking OCP given at appointment and started second pack today and she is still having heavy bleeding. Corinne sent in some estrogen pills for her to take for seven days and will send another GYN referral for her bleeding. No further questions

## 2023-04-20 ENCOUNTER — PATIENT MESSAGE (OUTPATIENT)
Dept: OBSTETRICS AND GYNECOLOGY | Facility: CLINIC | Age: 33
End: 2023-04-20
Payer: MEDICAID

## 2023-05-02 ENCOUNTER — OFFICE VISIT (OUTPATIENT)
Dept: OBSTETRICS AND GYNECOLOGY | Facility: CLINIC | Age: 33
End: 2023-05-02
Payer: MEDICAID

## 2023-05-02 VITALS
SYSTOLIC BLOOD PRESSURE: 156 MMHG | HEIGHT: 64 IN | WEIGHT: 293 LBS | DIASTOLIC BLOOD PRESSURE: 84 MMHG | BODY MASS INDEX: 50.02 KG/M2

## 2023-05-02 DIAGNOSIS — Z11.3 SCREEN FOR STD (SEXUALLY TRANSMITTED DISEASE): ICD-10-CM

## 2023-05-02 DIAGNOSIS — Z12.4 SCREENING FOR MALIGNANT NEOPLASM OF THE CERVIX: Primary | ICD-10-CM

## 2023-05-02 DIAGNOSIS — N93.8 DYSFUNCTIONAL UTERINE BLEEDING: ICD-10-CM

## 2023-05-02 PROCEDURE — 87491 CHLAMYDIA/GC, PCR (THINPREP): ICD-10-PCS | Mod: ,,, | Performed by: CLINICAL MEDICAL LABORATORY

## 2023-05-02 PROCEDURE — 87491 CHLMYD TRACH DNA AMP PROBE: CPT | Mod: ,,, | Performed by: CLINICAL MEDICAL LABORATORY

## 2023-05-02 PROCEDURE — 3008F BODY MASS INDEX DOCD: CPT | Mod: CPTII,,, | Performed by: OBSTETRICS & GYNECOLOGY

## 2023-05-02 PROCEDURE — 3077F SYST BP >= 140 MM HG: CPT | Mod: CPTII,,, | Performed by: OBSTETRICS & GYNECOLOGY

## 2023-05-02 PROCEDURE — 88142 CYTOPATH C/V THIN LAYER: CPT | Mod: TC,GCY | Performed by: OBSTETRICS & GYNECOLOGY

## 2023-05-02 PROCEDURE — 1159F MED LIST DOCD IN RCRD: CPT | Mod: CPTII,,, | Performed by: OBSTETRICS & GYNECOLOGY

## 2023-05-02 PROCEDURE — 99214 OFFICE O/P EST MOD 30 MIN: CPT | Mod: PBBFAC | Performed by: OBSTETRICS & GYNECOLOGY

## 2023-05-02 PROCEDURE — 3008F PR BODY MASS INDEX (BMI) DOCUMENTED: ICD-10-PCS | Mod: CPTII,,, | Performed by: OBSTETRICS & GYNECOLOGY

## 2023-05-02 PROCEDURE — 87624 HPV HI-RISK TYP POOLED RSLT: CPT | Mod: ,,, | Performed by: CLINICAL MEDICAL LABORATORY

## 2023-05-02 PROCEDURE — 87624 HUMAN PAPILLOMAVIRUS (HPV): ICD-10-PCS | Mod: ,,, | Performed by: CLINICAL MEDICAL LABORATORY

## 2023-05-02 PROCEDURE — 3077F PR MOST RECENT SYSTOLIC BLOOD PRESSURE >= 140 MM HG: ICD-10-PCS | Mod: CPTII,,, | Performed by: OBSTETRICS & GYNECOLOGY

## 2023-05-02 PROCEDURE — 87591 N.GONORRHOEAE DNA AMP PROB: CPT | Mod: ,,, | Performed by: CLINICAL MEDICAL LABORATORY

## 2023-05-02 PROCEDURE — 87591 CHLAMYDIA/GC, PCR (THINPREP): ICD-10-PCS | Mod: ,,, | Performed by: CLINICAL MEDICAL LABORATORY

## 2023-05-02 PROCEDURE — 99213 PR OFFICE/OUTPT VISIT, EST, LEVL III, 20-29 MIN: ICD-10-PCS | Mod: S$PBB,,, | Performed by: OBSTETRICS & GYNECOLOGY

## 2023-05-02 PROCEDURE — 3079F DIAST BP 80-89 MM HG: CPT | Mod: CPTII,,, | Performed by: OBSTETRICS & GYNECOLOGY

## 2023-05-02 PROCEDURE — 1159F PR MEDICATION LIST DOCUMENTED IN MEDICAL RECORD: ICD-10-PCS | Mod: CPTII,,, | Performed by: OBSTETRICS & GYNECOLOGY

## 2023-05-02 PROCEDURE — 3079F PR MOST RECENT DIASTOLIC BLOOD PRESSURE 80-89 MM HG: ICD-10-PCS | Mod: CPTII,,, | Performed by: OBSTETRICS & GYNECOLOGY

## 2023-05-02 PROCEDURE — 99213 OFFICE O/P EST LOW 20 MIN: CPT | Mod: S$PBB,,, | Performed by: OBSTETRICS & GYNECOLOGY

## 2023-05-02 NOTE — PATIENT INSTRUCTIONS
We discussed follow-up of Pap taken today.      Discussed checking CBC and hCG.      Discussed follow-up appointment for endometrial biopsy.

## 2023-05-02 NOTE — PROGRESS NOTES
Subjective:       Patient ID: Adriana Deleon is a 32 y.o. female.    Chief Complaint: Abdominal Pain and Dysfunctional Uterine Bleeding (Pt presents with hx of irregular bleeding , lower abd/pelvic pain.  She also having some vaginal discharge.  She states she had constant bleeding from 7/12/22-4/30/2023, it stopped and has light spotting on yesterday.  She has been on ocp's and depo , received last depo in feb .)    Presents as a new patient.  Referred to me by nurse midwife Corinne Mercado.      Patient has a very long history of irregular vaginal bleeding.  She had a baby in December of 2017.  Since July 12, 2022 up until present she has had almost daily irregular spotting.      Patient states she is not been sexually active since the delivery of her child November 28, 2017.      She is been tried on numerous medications she is recently been receiving Depo-Provera every 3 months with last injection February 2023.  In addition either in January or February of 2023 she was given 1 course of Provera 10 mg a day for 10 days.      In addition in February and March she took oral contraceptives.  She is now off oral contraceptives.  She is continued to have light irregular bleeding on a daily basis.    Gynecologic Exam  The patient's primary symptoms include a genital odor, pelvic pain, vaginal bleeding and vaginal discharge. The patient's pertinent negatives include no genital itching, genital lesions, genital rash or missed menses. This is a chronic problem. The current episode started more than 1 month ago. The problem occurs daily. The problem has been waxing and waning. The pain is moderate. She is not pregnant. Associated symptoms include abdominal pain, headaches and nausea. Pertinent negatives include no anorexia, back pain, chills, constipation, diarrhea, discolored urine, dysuria, fever, flank pain, frequency, hematuria, painful intercourse, rash, urgency or vomiting. The vaginal discharge was brown and  watery. The vaginal bleeding is typical of menses. She has been passing clots. She has not been passing tissue. The symptoms are aggravated by eating. She has tried antibiotics, heating pad, NSAIDs and warm bath for the symptoms. The treatment provided mild relief. She is not sexually active. No, her partner does not have an STD. She uses oral contraceptives and progestin injections for contraception. Her menstrual history has been irregular. Her past medical history is significant for a  section, menorrhagia, metrorrhagia, ovarian cysts, an STD and vaginosis. There is no history of an abdominal surgery, an ectopic pregnancy, endometriosis, a gynecological surgery, herpes simplex, miscarriage, perineal abscess or PID.   Review of Systems   Constitutional:  Negative for chills and fever.   Gastrointestinal:  Positive for abdominal pain and nausea. Negative for anorexia, constipation, diarrhea and vomiting.   Genitourinary:  Positive for menorrhagia, pelvic pain and vaginal discharge. Negative for dysuria, flank pain, frequency, hematuria, missed menses and urgency.   Musculoskeletal:  Negative for back pain.   Integumentary:  Negative for rash.   Neurological:  Positive for headaches.       Objective:      Physical Exam  Abdominal:      Comments: Abdomen soft nondistended nontender to palpation   Genitourinary:     Comments: External normal lubricated speculum was used.  Patient has very tight vaginal introitus.  Cervix was visualized light bleeding noted.  Pap taken.  No obvious external lesions.      Single digit examination revealed uterus thought to be normal size although exam difficult secondary to abdominal girth.  No obvious cervical motion tenderness.  However patient has significant guarding during examination secondary to emotional anxiety during examination.            Assessment:       1. Screening for malignant neoplasm of the cervix    2. Dysfunctional uterine bleeding    3. Screen for STD  (sexually transmitted disease)        Plan:       Patient Instructions   We discussed follow-up of Pap taken today.      Discussed checking CBC and hCG.      Discussed follow-up appointment for endometrial biopsy.

## 2023-05-04 LAB
GH SERPL-MCNC: NORMAL NG/ML
INSULIN SERPL-ACNC: NORMAL U[IU]/ML
LAB AP CLINICAL INFORMATION: NORMAL
LAB AP GYN INTERPRETATION: NEGATIVE
LAB AP PAP DISCLAIMER COMMENTS: NORMAL
RENIN PLAS-CCNC: NORMAL NG/ML/H

## 2023-05-05 LAB
CHLAMYDIA BY PCR: NEGATIVE
HPV 16: NEGATIVE
HPV 18: NEGATIVE
HPV OTHER: NEGATIVE
N. GONORRHOEAE (GC) BY PCR: NEGATIVE

## 2023-06-23 DIAGNOSIS — N93.8 DYSFUNCTIONAL UTERINE BLEEDING: Primary | ICD-10-CM

## 2023-07-01 ENCOUNTER — PATIENT MESSAGE (OUTPATIENT)
Dept: OBSTETRICS AND GYNECOLOGY | Facility: CLINIC | Age: 33
End: 2023-07-01
Payer: MEDICAID

## 2023-07-10 ENCOUNTER — PROCEDURE VISIT (OUTPATIENT)
Dept: OBSTETRICS AND GYNECOLOGY | Facility: CLINIC | Age: 33
End: 2023-07-10
Payer: MEDICAID

## 2023-07-10 VITALS — DIASTOLIC BLOOD PRESSURE: 72 MMHG | SYSTOLIC BLOOD PRESSURE: 130 MMHG

## 2023-07-10 DIAGNOSIS — N93.8 DYSFUNCTIONAL UTERINE BLEEDING: Primary | ICD-10-CM

## 2023-07-10 PROCEDURE — 88305 TISSUE EXAM BY PATHOLOGIST: CPT | Mod: TC,SUR | Performed by: OBSTETRICS & GYNECOLOGY

## 2023-07-10 PROCEDURE — 58100 PR BIOPSY OF UTERUS LINING: ICD-10-PCS | Mod: S$PBB,,, | Performed by: OBSTETRICS & GYNECOLOGY

## 2023-07-10 PROCEDURE — 88305 TISSUE EXAM BY PATHOLOGIST: CPT | Mod: 26,,, | Performed by: PATHOLOGY

## 2023-07-10 PROCEDURE — 58100 BIOPSY OF UTERUS LINING: CPT | Mod: PBBFAC | Performed by: OBSTETRICS & GYNECOLOGY

## 2023-07-10 PROCEDURE — 88305 SURGICAL PATHOLOGY: ICD-10-PCS | Mod: 26,,, | Performed by: PATHOLOGY

## 2023-07-10 PROCEDURE — 58100 BIOPSY OF UTERUS LINING: CPT | Mod: S$PBB,,, | Performed by: OBSTETRICS & GYNECOLOGY

## 2023-07-10 RX ORDER — NORETHINDRONE ACETATE AND ETHINYL ESTRADIOL 1.5-30(21)
1 KIT ORAL DAILY
Qty: 30 TABLET | Refills: 11 | Status: SHIPPED | OUTPATIENT
Start: 2023-07-10

## 2023-07-10 NOTE — PATIENT INSTRUCTIONS
Discussed beginning oral contraceptives as directed.    Risks versus benefits of oral contraceptives discussed.    No history of blood clots.  She is had 1 baby no history of blood clots.    Instructed to continue oral contraceptives even if breakthrough bleeding occurs.      Follow-up appointment with me in 3-4 months.

## 2023-07-10 NOTE — PROCEDURES
Endometrial biopsy    Date/Time: 7/10/2023 11:15 AM  Performed by: Levy Crowder MD  Authorized by: Levy Crowder MD     Consent:     Consent obtained:  Written    Consent given by:  Patient    Patient questions answered: yes      Patient agrees, verbalizes understanding, and wants to proceed: yes      Educational handouts given: no      Instructions and paperwork completed: yes    Indication:     Indications: Menorrhagia    Pre-procedure:     Pre-procedure timeout performed: yes    Procedure:     Cervix cleaned and prepped: yes      A paracervical block was performed: no      An intracervical block was performed: no      The cervix was dilated: yes      Uterus sounded: yes      Uterus sound depth (cm):  7    Curettes used:  1    Specimen collected: specimen collected and sent to pathology    Comments:     Procedure comments:  Speculum inserted with patient in knee-chest position I was able to visualize cervix.  Cervix was painted with Betadine.  Tenaculum was not required.  I used a pipette and inserted approximally 7 cm.  Curetting was obtained adequate tissue was obtained.

## 2023-07-11 LAB
ESTROGEN SERPL-MCNC: NORMAL PG/ML
INSULIN SERPL-ACNC: NORMAL U[IU]/ML
LAB AP CLINICAL INFORMATION: NORMAL
LAB AP GROSS DESCRIPTION: NORMAL
LAB AP LABORATORY NOTES: NORMAL
T3RU NFR SERPL: NORMAL %

## 2023-08-02 ENCOUNTER — NURSE TRIAGE (OUTPATIENT)
Dept: ADMINISTRATIVE | Facility: CLINIC | Age: 33
End: 2023-08-02

## 2023-08-02 ENCOUNTER — HOSPITAL ENCOUNTER (EMERGENCY)
Facility: HOSPITAL | Age: 33
Discharge: HOME | End: 2023-08-02
Attending: EMERGENCY MEDICINE
Payer: MEDICAID

## 2023-08-02 VITALS
BODY MASS INDEX: 50.02 KG/M2 | OXYGEN SATURATION: 96 % | HEART RATE: 93 BPM | DIASTOLIC BLOOD PRESSURE: 77 MMHG | TEMPERATURE: 99 F | WEIGHT: 293 LBS | HEIGHT: 64 IN | SYSTOLIC BLOOD PRESSURE: 153 MMHG | RESPIRATION RATE: 20 BRPM

## 2023-08-02 DIAGNOSIS — R33.9 URINARY RETENTION: ICD-10-CM

## 2023-08-02 DIAGNOSIS — G62.9 NEUROPATHY: Primary | ICD-10-CM

## 2023-08-02 LAB
ALBUMIN SERPL BCP-MCNC: 2.8 G/DL (ref 3.5–5)
ALBUMIN/GLOB SERPL: 0.6 {RATIO}
ALP SERPL-CCNC: 66 U/L (ref 37–98)
ALT SERPL W P-5'-P-CCNC: 45 U/L (ref 13–56)
ANION GAP SERPL CALCULATED.3IONS-SCNC: 6 MMOL/L (ref 7–16)
AST SERPL W P-5'-P-CCNC: 43 U/L (ref 15–37)
BACTERIA #/AREA URNS HPF: ABNORMAL /HPF
BASOPHILS # BLD AUTO: 0.04 K/UL (ref 0–0.2)
BASOPHILS NFR BLD AUTO: 0.5 % (ref 0–1)
BILIRUB SERPL-MCNC: 0.2 MG/DL (ref ?–1.2)
BILIRUB UR QL STRIP: NEGATIVE
BUN SERPL-MCNC: 16 MG/DL (ref 7–18)
BUN/CREAT SERPL: 16 (ref 6–20)
CALCIUM SERPL-MCNC: 8.8 MG/DL (ref 8.5–10.1)
CHLORIDE SERPL-SCNC: 108 MMOL/L (ref 98–107)
CLARITY UR: CLEAR
CO2 SERPL-SCNC: 30 MMOL/L (ref 21–32)
COLOR UR: YELLOW
CREAT SERPL-MCNC: 1.03 MG/DL (ref 0.55–1.02)
DIFFERENTIAL METHOD BLD: ABNORMAL
EGFR (NO RACE VARIABLE) (RUSH/TITUS): 75 ML/MIN/1.73M2
EOSINOPHIL # BLD AUTO: 0.2 K/UL (ref 0–0.5)
EOSINOPHIL NFR BLD AUTO: 2.6 % (ref 1–4)
ERYTHROCYTE [DISTWIDTH] IN BLOOD BY AUTOMATED COUNT: 17.3 % (ref 11.5–14.5)
GLOBULIN SER-MCNC: 4.8 G/DL (ref 2–4)
GLUCOSE SERPL-MCNC: 134 MG/DL (ref 74–106)
GLUCOSE SERPL-MCNC: 141 MG/DL (ref 70–105)
GLUCOSE UR STRIP-MCNC: NORMAL MG/DL
HCT VFR BLD AUTO: 33.7 % (ref 38–47)
HGB BLD-MCNC: 10.4 G/DL (ref 12–16)
IMM GRANULOCYTES # BLD AUTO: 0.02 K/UL (ref 0–0.04)
IMM GRANULOCYTES NFR BLD: 0.3 % (ref 0–0.4)
KETONES UR STRIP-SCNC: NEGATIVE MG/DL
LEUKOCYTE ESTERASE UR QL STRIP: NEGATIVE
LYMPHOCYTES # BLD AUTO: 2.46 K/UL (ref 1–4.8)
LYMPHOCYTES NFR BLD AUTO: 32.4 % (ref 27–41)
MCH RBC QN AUTO: 23.6 PG (ref 27–31)
MCHC RBC AUTO-ENTMCNC: 30.9 G/DL (ref 32–36)
MCV RBC AUTO: 76.6 FL (ref 80–96)
MONOCYTES # BLD AUTO: 0.54 K/UL (ref 0–0.8)
MONOCYTES NFR BLD AUTO: 7.1 % (ref 2–6)
MPC BLD CALC-MCNC: 9.4 FL (ref 9.4–12.4)
MUCOUS, UA: ABNORMAL /LPF
NEUTROPHILS # BLD AUTO: 4.34 K/UL (ref 1.8–7.7)
NEUTROPHILS NFR BLD AUTO: 57.1 % (ref 53–65)
NITRITE UR QL STRIP: NEGATIVE
NRBC # BLD AUTO: 0 X10E3/UL
NRBC, AUTO (.00): 0 %
NT-PROBNP SERPL-MCNC: 133 PG/ML (ref 1–125)
PH UR STRIP: 6 PH UNITS
PLATELET # BLD AUTO: 361 K/UL (ref 150–400)
POTASSIUM SERPL-SCNC: 3 MMOL/L (ref 3.5–5.1)
PROT SERPL-MCNC: 7.6 G/DL (ref 6.4–8.2)
PROT UR QL STRIP: 70
RBC # BLD AUTO: 4.4 M/UL (ref 4.2–5.4)
RBC # UR STRIP: ABNORMAL /UL
RBC #/AREA URNS HPF: 4 /HPF
SODIUM SERPL-SCNC: 141 MMOL/L (ref 136–145)
SP GR UR STRIP: 1.04
SQUAMOUS #/AREA URNS LPF: ABNORMAL /HPF
UROBILINOGEN UR STRIP-ACNC: NORMAL MG/DL
WBC # BLD AUTO: 7.6 K/UL (ref 4.5–11)
WBC #/AREA URNS HPF: 3 /HPF

## 2023-08-02 PROCEDURE — 99284 EMERGENCY DEPT VISIT MOD MDM: CPT | Mod: 25

## 2023-08-02 PROCEDURE — 82962 GLUCOSE BLOOD TEST: CPT

## 2023-08-02 PROCEDURE — 80053 COMPREHEN METABOLIC PANEL: CPT | Performed by: EMERGENCY MEDICINE

## 2023-08-02 PROCEDURE — 85025 COMPLETE CBC W/AUTO DIFF WBC: CPT | Performed by: EMERGENCY MEDICINE

## 2023-08-02 PROCEDURE — 25000003 PHARM REV CODE 250

## 2023-08-02 PROCEDURE — 83880 ASSAY OF NATRIURETIC PEPTIDE: CPT | Performed by: EMERGENCY MEDICINE

## 2023-08-02 PROCEDURE — 99284 PR EMERGENCY DEPT VISIT,LEVEL IV: ICD-10-PCS | Mod: GC,,, | Performed by: EMERGENCY MEDICINE

## 2023-08-02 PROCEDURE — 99284 EMERGENCY DEPT VISIT MOD MDM: CPT | Mod: GC,,, | Performed by: EMERGENCY MEDICINE

## 2023-08-02 PROCEDURE — 96360 HYDRATION IV INFUSION INIT: CPT

## 2023-08-02 PROCEDURE — 81001 URINALYSIS AUTO W/SCOPE: CPT | Performed by: EMERGENCY MEDICINE

## 2023-08-02 RX ORDER — POTASSIUM CHLORIDE 20 MEQ/1
40 TABLET, EXTENDED RELEASE ORAL ONCE
Status: COMPLETED | OUTPATIENT
Start: 2023-08-02 | End: 2023-08-02

## 2023-08-02 RX ADMIN — POTASSIUM CHLORIDE 40 MEQ: 1500 TABLET, EXTENDED RELEASE ORAL at 11:08

## 2023-08-02 RX ADMIN — SODIUM CHLORIDE 1000 ML: 9 INJECTION, SOLUTION INTRAVENOUS at 12:08

## 2023-08-02 NOTE — ED PROVIDER NOTES
Encounter Date: 2023       History     Chief Complaint   Patient presents with    Dysuria     Pt has not been able to urinate for 2 days. Also c/o numbness in feet for 3 days.     This is a 32 y.o female who presents today with complaints of urinary retention, and numbness in BLE. Patient states she has not been able to urinate for two day and has been experiencing numbness for 3 days. She describes the numbness with pain that starts in her thighs and continues to her feet. She describes the pain in her feet as stabbing needles when she walks. She reports taking a diuretic for her blood pressure but still not able to urinate. Patient also endorses an HA, blurry vision, back pain when trying to urinate and LE edema. Patient denies fever, fatigue, N/V/D, shortness of breath, or chest pain.      Review of patient's allergies indicates:  No Known Allergies  Past Medical History:   Diagnosis Date    Abnormal uterine bleeding     Amenorrhea     Anemia     Depression     Diabetes mellitus, type 2     Dysmenorrhea     ENNITA (generalized anxiety disorder)     H/O: depression     Hypertension     Infertility, female     PID (pelvic inflammatory disease)     Sleep apnea, obstructive     STD (sexually transmitted disease)      Past Surgical History:   Procedure Laterality Date     SECTION  2017    Dr. Galeano, [facility] [place]     History reviewed. No pertinent family history.  Social History     Tobacco Use    Smoking status: Former     Current packs/day: 0.00     Types: Vaping with nicotine    Smokeless tobacco: Never    Tobacco comments:     Stopped x 3 months ago.   Substance Use Topics    Alcohol use: Not Currently    Drug use: Never     Review of Systems   Constitutional:  Negative for fatigue and fever.   HENT:  Negative for congestion, hearing loss, sinus pressure and sinus pain.    Eyes:  Negative for pain, discharge and itching.   Respiratory:  Negative for cough and shortness of breath.    Cardiovascular:   Positive for leg swelling.   Gastrointestinal:  Negative for abdominal distention, abdominal pain, anal bleeding, constipation and diarrhea.   Endocrine: Negative for cold intolerance, heat intolerance and polydipsia.   Genitourinary:  Positive for difficulty urinating.   Musculoskeletal:  Positive for back pain.   Skin: Negative.    Allergic/Immunologic: Negative.    Neurological:  Positive for headaches.   Hematological: Negative.    Psychiatric/Behavioral: Negative.         Physical Exam     Initial Vitals [08/02/23 0841]   BP Pulse Resp Temp SpO2   (!) 153/77 93 20 98.5 °F (36.9 °C) 96 %      MAP       --         Physical Exam    Constitutional: She appears well-developed and well-nourished.   HENT:   Head: Normocephalic and atraumatic.   Eyes: Pupils are equal, round, and reactive to light.   Eye redness noted   Neck:   Normal range of motion.  Cardiovascular:  Normal rate.           Pulmonary/Chest: Breath sounds normal.   Abdominal: Abdomen is soft. Bowel sounds are normal.   Musculoskeletal:         General: Tenderness present.      Cervical back: Normal range of motion.      Comments:  Bilateral Lower extremity pain and numbness     Neurological: She is alert and oriented to person, place, and time.   Skin: Skin is warm and dry. Capillary refill takes less than 2 seconds.   Psychiatric: She has a normal mood and affect.         Medical Screening Exam   See Full Note    ED Course   Procedures  Labs Reviewed   COMPREHENSIVE METABOLIC PANEL - Abnormal; Notable for the following components:       Result Value    Potassium 3.0 (*)     Chloride 108 (*)     Anion Gap 6 (*)     Glucose 134 (*)     Creatinine 1.03 (*)     Albumin 2.8 (*)     Globulin 4.8 (*)     AST 43 (*)     All other components within normal limits   URINALYSIS, REFLEX TO URINE CULTURE - Abnormal; Notable for the following components:    Protein, UA 70 (*)     Blood, UA Trace (*)     Specific Gravity, UA 1.036 (*)     All other components within  normal limits   CBC WITH DIFFERENTIAL - Abnormal; Notable for the following components:    Hemoglobin 10.4 (*)     Hematocrit 33.7 (*)     MCV 76.6 (*)     MCH 23.6 (*)     MCHC 30.9 (*)     RDW 17.3 (*)     Monocytes % 7.1 (*)     All other components within normal limits   NT-PRO NATRIURETIC PEPTIDE - Abnormal; Notable for the following components:    ProBNP 133 (*)     All other components within normal limits   URINALYSIS, MICROSCOPIC - Abnormal; Notable for the following components:    RBC, UA 4 (*)     Bacteria, UA Few (*)     Squamous Epithelial Cells, UA Occasional (*)     Mucous Many (*)     All other components within normal limits   POCT GLUCOSE MONITORING CONTINUOUS - Abnormal; Notable for the following components:    POC Glucose 141 (*)     All other components within normal limits   CBC W/ AUTO DIFFERENTIAL    Narrative:     The following orders were created for panel order CBC auto differential.  Procedure                               Abnormality         Status                     ---------                               -----------         ------                     CBC with Differential[[id number]]        Abnormal            Final result                 Please view results for these tests on the individual orders.          Imaging Results    None          Medications   potassium chloride SA CR tablet 40 mEq (40 mEq Oral Given 8/2/23 1137)   sodium chloride 0.9% bolus 1,000 mL 1,000 mL (0 mLs Intravenous Stopped 8/2/23 1333)     Medical Decision Making:   Initial Assessment:   PATIENT REPORTING URINARY RETENTION.    Differential Diagnosis:   DDX:  URINARY RETENTION VS OTHER OBSTRUCTION VS OTHER  Clinical Tests:   Lab Tests: Ordered and Reviewed  ED Management:  DX:  NEUROPATHY,  URINARY RETENTION.    OUTPATIENT FOLLOW UP.              Attending Attestation:   Physician Attestation Statement for Resident:  As the supervising MD   Physician Attestation Statement: I have personally seen and examined this  patient.   I agree with the above history.  -:   As the supervising MD I agree with the above PE.     As the supervising MD I agree with the above treatment, course, plan, and disposition.                    ED Course as of 08/23/23 1336   Wed Aug 02, 2023   1124 Calcium: 8.8 [MB]      ED Course User Index  [MB] Chas Ramos MD                Clinical Impression:   Final diagnoses:  [G62.9] Neuropathy (Primary)  [R33.9] Urinary retention        ED Disposition Condition    Discharge Stable          ED Prescriptions    None       Follow-up Information       Follow up With Specialties Details Why Contact Info    Niesha Machado MD Family Medicine Schedule an appointment as soon as possible for a visit in 1 week Follow up with neuropathy symptoms and diabetes control. [address]  [place] [address]  [phone]               Raymond Garza MD  08/23/23 1336

## 2023-08-02 NOTE — TELEPHONE ENCOUNTER
Pt states no urine x24 hours, back is hurting,feels weak. Pt states is drinking water, had approx 4 bottles this am. Pt denies urge to urinate or bladder feeling full. Per protocol, see pcp within 24 hours. Discussed appt with PCP, or UC/ED. Advised pt to continue drinking fluids and to monitor for worsening symptoms. Pt verbalizes understanding to call back for any further questions or concerns.   Reason for Disposition   Side (flank) or lower back pain present    Additional Information   Negative: Shock suspected (e.g., cold/pale/clammy skin, too weak to stand, low BP, rapid pulse)   Negative: Sounds like a life-threatening emergency to the triager   Negative: [1] Unable to urinate (or only a few drops) > 4 hours AND [2] bladder feels very full (e.g., palpable bladder or strong urge to urinate)   Negative: [1] Decreased urination and [2] drinking very little AND [2] dehydration suspected (e.g., dark urine, no urine > 12 hours, very dry mouth, very lightheaded)   Negative: Patient sounds very sick or weak to the triager   Negative: Fever > 100.4 F (38.0 C)    Protocols used: Urinary Symptoms-A-AH

## 2023-08-02 NOTE — DISCHARGE INSTRUCTIONS
If symptoms worsen please return to nearest Emergency department  Follow up with PCP as soon as possible for neuropathy symptoms

## 2023-08-02 NOTE — Clinical Note
"Adriana COCHRAN" Pancho was seen and treated in our emergency department on 8/2/2023.  She may return to work on 08/03/2023.       If you have any questions or concerns, please don't hesitate to call.      Chas Ramos MD"

## 2023-08-07 ENCOUNTER — OFFICE VISIT (OUTPATIENT)
Dept: FAMILY MEDICINE | Facility: CLINIC | Age: 33
End: 2023-08-07
Payer: MEDICAID

## 2023-08-07 VITALS
HEART RATE: 88 BPM | RESPIRATION RATE: 20 BRPM | WEIGHT: 293 LBS | DIASTOLIC BLOOD PRESSURE: 94 MMHG | SYSTOLIC BLOOD PRESSURE: 157 MMHG | TEMPERATURE: 99 F | OXYGEN SATURATION: 96 % | HEIGHT: 64 IN | BODY MASS INDEX: 50.02 KG/M2

## 2023-08-07 DIAGNOSIS — E87.6 HYPOKALEMIA: ICD-10-CM

## 2023-08-07 DIAGNOSIS — E66.01 MORBID OBESITY WITH BMI OF 60.0-69.9, ADULT: ICD-10-CM

## 2023-08-07 DIAGNOSIS — E11.65 TYPE 2 DIABETES MELLITUS WITH HYPERGLYCEMIA, WITHOUT LONG-TERM CURRENT USE OF INSULIN: ICD-10-CM

## 2023-08-07 DIAGNOSIS — H53.9 ABNORMAL VISION: ICD-10-CM

## 2023-08-07 DIAGNOSIS — H53.8 BLURRY VISION: ICD-10-CM

## 2023-08-07 DIAGNOSIS — G62.9 NEUROPATHY: ICD-10-CM

## 2023-08-07 DIAGNOSIS — E11.39 TYPE 2 DIABETES MELLITUS WITH OTHER OPHTHALMIC COMPLICATION, WITHOUT LONG-TERM CURRENT USE OF INSULIN: ICD-10-CM

## 2023-08-07 DIAGNOSIS — D50.8 IRON DEFICIENCY ANEMIA SECONDARY TO INADEQUATE DIETARY IRON INTAKE: ICD-10-CM

## 2023-08-07 DIAGNOSIS — D50.9 MICROCYTIC ANEMIA: Primary | ICD-10-CM

## 2023-08-07 DIAGNOSIS — I10 ESSENTIAL HYPERTENSION, BENIGN: ICD-10-CM

## 2023-08-07 DIAGNOSIS — Z00.00 HEALTH CARE MAINTENANCE: ICD-10-CM

## 2023-08-07 PROCEDURE — 3044F HG A1C LEVEL LT 7.0%: CPT | Mod: CPTII,,, | Performed by: FAMILY MEDICINE

## 2023-08-07 PROCEDURE — 3080F DIAST BP >= 90 MM HG: CPT | Mod: CPTII,,, | Performed by: FAMILY MEDICINE

## 2023-08-07 PROCEDURE — 3044F PR MOST RECENT HEMOGLOBIN A1C LEVEL <7.0%: ICD-10-PCS | Mod: CPTII,,, | Performed by: FAMILY MEDICINE

## 2023-08-07 PROCEDURE — 3077F SYST BP >= 140 MM HG: CPT | Mod: CPTII,,, | Performed by: FAMILY MEDICINE

## 2023-08-07 PROCEDURE — 99214 PR OFFICE/OUTPT VISIT, EST, LEVL IV, 30-39 MIN: ICD-10-PCS | Mod: GC,,, | Performed by: FAMILY MEDICINE

## 2023-08-07 PROCEDURE — 3008F PR BODY MASS INDEX (BMI) DOCUMENTED: ICD-10-PCS | Mod: CPTII,,, | Performed by: FAMILY MEDICINE

## 2023-08-07 PROCEDURE — 1159F PR MEDICATION LIST DOCUMENTED IN MEDICAL RECORD: ICD-10-PCS | Mod: CPTII,,, | Performed by: FAMILY MEDICINE

## 2023-08-07 PROCEDURE — 3008F BODY MASS INDEX DOCD: CPT | Mod: CPTII,,, | Performed by: FAMILY MEDICINE

## 2023-08-07 PROCEDURE — 99214 OFFICE O/P EST MOD 30 MIN: CPT | Mod: GC,,, | Performed by: FAMILY MEDICINE

## 2023-08-07 PROCEDURE — 1159F MED LIST DOCD IN RCRD: CPT | Mod: CPTII,,, | Performed by: FAMILY MEDICINE

## 2023-08-07 PROCEDURE — 3080F PR MOST RECENT DIASTOLIC BLOOD PRESSURE >= 90 MM HG: ICD-10-PCS | Mod: CPTII,,, | Performed by: FAMILY MEDICINE

## 2023-08-07 PROCEDURE — 3077F PR MOST RECENT SYSTOLIC BLOOD PRESSURE >= 140 MM HG: ICD-10-PCS | Mod: CPTII,,, | Performed by: FAMILY MEDICINE

## 2023-08-07 RX ORDER — PREGABALIN 50 MG/1
50 CAPSULE ORAL 3 TIMES DAILY
Qty: 270 CAPSULE | Refills: 3 | Status: SHIPPED | OUTPATIENT
Start: 2023-08-07 | End: 2023-08-07 | Stop reason: CLARIF

## 2023-08-07 RX ORDER — PREGABALIN 50 MG/1
50 CAPSULE ORAL 3 TIMES DAILY
Qty: 90 CAPSULE | Refills: 2 | Status: SHIPPED | OUTPATIENT
Start: 2023-08-07 | End: 2023-08-14 | Stop reason: ALTCHOICE

## 2023-08-07 RX ORDER — METFORMIN HYDROCHLORIDE 500 MG/1
500 TABLET ORAL 2 TIMES DAILY
Qty: 180 TABLET | Refills: 3 | Status: SHIPPED | OUTPATIENT
Start: 2023-08-07 | End: 2024-08-06

## 2023-08-07 RX ORDER — PIOGLITAZONEHYDROCHLORIDE 15 MG/1
15 TABLET ORAL DAILY
Qty: 90 TABLET | Refills: 3 | Status: SHIPPED | OUTPATIENT
Start: 2023-08-07 | End: 2024-08-06

## 2023-08-07 NOTE — PROGRESS NOTES
Subjective:       Patient ID: Adriana Deleon is a 32 y.o. female.    Chief Complaint: Follow-up (Er follow up from numbness in feet) and Blurred Vision (/)    This is a 32 y.o. female who presents today with complaints of numbness and tingling in her foot along with blurry vision. Patient states her feet having been numb and loosing feeling for the last month. On foot exam patient does not have feel on sole of feet bilaterally. With her eyes closed patient did not know when she was being touched on her feet. She is a diabetic and its unknown if it is under control. Patient wears glasses but states she has not had an appointment in over a year. Her glasses no longer helping and she is not sure if her glasses are prescription. Patient denies HA, fever, fatigue, N/V/D, shortness of breath or chest pain.     Plan for today is refer patient to ophthalmology.  Refill medication. CBC, BMP, Fe, TIBC, Ferritin, B12, Hemoglobin A1C, Lipid panel, Hep C, HIV.         Current Outpatient Medications:     ARIPiprazole (ABILIFY) 10 MG Tab, Take 10 mg by mouth once daily., Disp: , Rfl:     benztropine (COGENTIN) 0.5 MG tablet, Take 0.5 mg by mouth once daily., Disp: , Rfl:     buPROPion (WELLBUTRIN SR) 100 MG TBSR 12 hr tablet, Take 100 mg by mouth 2 (two) times daily., Disp: , Rfl:     haloperidoL (HALDOL) 10 MG tablet, Take 10 mg by mouth 2 (two) times daily., Disp: , Rfl:     norethindrone-ethinyl estradiol-iron (MICROGESTIN FE1.5/30) 1.5 mg-30 mcg (21)/75 mg (7) tablet, Take 1 tablet by mouth once daily., Disp: 30 tablet, Rfl: 11    traZODone (DESYREL) 150 MG tablet, Take 150 mg by mouth nightly as needed., Disp: , Rfl:     triamterene-hydrochlorothiazide 37.5-25 mg (MAXZIDE-25) 37.5-25 mg per tablet, Take 1 tablet by mouth once daily., Disp: , Rfl:     TRINTELLIX 10 mg Tab, Take 1 tablet by mouth once daily., Disp: , Rfl:     ferrous sulfate 325 (65 FE) MG EC tablet, Take 1 tablet (325 mg total) by mouth 2 (two) times daily.  (Patient not taking: Reported on 2023), Disp: 60 tablet, Rfl: 0    loratadine (CLARITIN) 10 mg tablet, Take 1 tablet (10 mg total) by mouth once daily., Disp: 30 tablet, Rfl: 2    metFORMIN (GLUCOPHAGE) 500 MG tablet, Take 1 tablet (500 mg total) by mouth 2 (two) times daily., Disp: 180 tablet, Rfl: 3    pioglitazone (ACTOS) 15 MG tablet, Take 1 tablet (15 mg total) by mouth once daily., Disp: 90 tablet, Rfl: 3    pregabalin (LYRICA) 50 MG capsule, Take 1 capsule (50 mg total) by mouth 3 (three) times daily., Disp: 90 capsule, Rfl: 2    Review of patient's allergies indicates:  No Known Allergies    Past Medical History:   Diagnosis Date    Abnormal uterine bleeding     Amenorrhea     Anemia     Depression     Diabetes mellitus, type 2     Dysmenorrhea     NENITA (generalized anxiety disorder)     H/O: depression     Hypertension     Infertility, female     PID (pelvic inflammatory disease)     Sleep apnea, obstructive     STD (sexually transmitted disease)        Past Surgical History:   Procedure Laterality Date     SECTION  2017    Dr. Galeano, [facility] [place]       History reviewed. No pertinent family history.    Social History     Tobacco Use    Smoking status: Former     Current packs/day: 0.00     Types: Vaping with nicotine    Smokeless tobacco: Never    Tobacco comments:     Stopped x 3 months ago.   Substance Use Topics    Alcohol use: Not Currently    Drug use: Never       Review of Systems   Constitutional:  Negative for activity change, appetite change, chills, diaphoresis, fatigue, fever and unexpected weight change.   HENT:  Negative for nasal congestion, dental problem, drooling, ear discharge, ear pain, facial swelling, hearing loss, mouth sores, nosebleeds, postnasal drip, rhinorrhea, sinus pressure/congestion, sneezing, sore throat, tinnitus, trouble swallowing, voice change and goiter.    Eyes:  Negative for photophobia, discharge, itching and visual disturbance.   Respiratory:  Negative for  apnea, cough, choking, chest tightness, shortness of breath, wheezing and stridor.    Cardiovascular:  Negative for chest pain, palpitations, leg swelling and claudication.   Gastrointestinal:  Negative for abdominal distention, abdominal pain, anal bleeding, blood in stool, change in bowel habit, constipation, diarrhea, nausea, vomiting and change in bowel habit.   Endocrine: Negative for cold intolerance, heat intolerance, polydipsia, polyphagia and polyuria.   Genitourinary:  Negative for bladder incontinence, decreased urine volume, difficulty urinating, dysuria, enuresis, flank pain, frequency, hematuria, nocturia, pelvic pain and urgency.   Musculoskeletal:  Negative for arthralgias, back pain, gait problem, joint swelling, leg pain, myalgias, neck pain, neck stiffness and joint deformity.   Integumentary:  Negative for pallor, rash, wound, breast mass and breast tenderness.   Allergic/Immunologic: Negative for environmental allergies, food allergies and immunocompromised state.   Neurological:  Negative for dizziness, vertigo, tremors, seizures, syncope, facial asymmetry, speech difficulty, weakness, light-headedness, numbness, headaches, coordination difficulties, memory loss and coordination difficulties.   Hematological:  Negative for adenopathy. Does not bruise/bleed easily.   Psychiatric/Behavioral:  Negative for agitation, behavioral problems, confusion, decreased concentration, dysphoric mood, hallucinations, self-injury, sleep disturbance and suicidal ideas. The patient is not nervous/anxious and is not hyperactive.    Breast: Negative for mass and tenderness        Current Medications:   Medication List with Changes/Refills   New Medications    PREGABALIN (LYRICA) 50 MG CAPSULE    Take 1 capsule (50 mg total) by mouth 3 (three) times daily.       Start Date: 8/7/2023  End Date: 11/5/2023   Current Medications    ARIPIPRAZOLE (ABILIFY) 10 MG TAB    Take 10 mg by mouth once daily.       Start Date:  4/8/2021  End Date: --    BENZTROPINE (COGENTIN) 0.5 MG TABLET    Take 0.5 mg by mouth once daily.       Start Date: 4/8/2021  End Date: --    BUPROPION (WELLBUTRIN SR) 100 MG TBSR 12 HR TABLET    Take 100 mg by mouth 2 (two) times daily.       Start Date: 11/18/2022End Date: --    FERROUS SULFATE 325 (65 FE) MG EC TABLET    Take 1 tablet (325 mg total) by mouth 2 (two) times daily.       Start Date: 12/31/2022End Date: --    HALOPERIDOL (HALDOL) 10 MG TABLET    Take 10 mg by mouth 2 (two) times daily.       Start Date: --        End Date: --    LORATADINE (CLARITIN) 10 MG TABLET    Take 1 tablet (10 mg total) by mouth once daily.       Start Date: 7/6/2021  End Date: 10/4/2021    NORETHINDRONE-ETHINYL ESTRADIOL-IRON (MICROGESTIN FE1.5/30) 1.5 MG-30 MCG (21)/75 MG (7) TABLET    Take 1 tablet by mouth once daily.       Start Date: 7/10/2023 End Date: --    TRAZODONE (DESYREL) 150 MG TABLET    Take 150 mg by mouth nightly as needed.       Start Date: 4/8/2021  End Date: --    TRIAMTERENE-HYDROCHLOROTHIAZIDE 37.5-25 MG (MAXZIDE-25) 37.5-25 MG PER TABLET    Take 1 tablet by mouth once daily.       Start Date: --        End Date: --    TRINTELLIX 10 MG TAB    Take 1 tablet by mouth once daily.       Start Date: 4/8/2021  End Date: --   Changed and/or Refilled Medications    Modified Medication Previous Medication    METFORMIN (GLUCOPHAGE) 500 MG TABLET metFORMIN (GLUCOPHAGE) 500 MG tablet       Take 1 tablet (500 mg total) by mouth 2 (two) times daily.    Take 1 tablet by mouth 2 (two) times daily.       Start Date: 8/7/2023  End Date: 8/6/2024    Start Date: --        End Date: 8/7/2023    PIOGLITAZONE (ACTOS) 15 MG TABLET pioglitazone (ACTOS) 15 MG tablet       Take 1 tablet (15 mg total) by mouth once daily.    Take 15 mg by mouth once daily.       Start Date: 8/7/2023  End Date: 8/6/2024    Start Date: 2/2/2023  End Date: 8/7/2023   Discontinued Medications    AMOXICILLIN (AMOXIL) 875 MG TABLET    Take 1 tablet (655  "mg total) by mouth every 12 (twelve) hours.       Start Date: 3/8/2022  End Date: 8/7/2023    EMPAGLIFLOZIN (JARDIANCE) 10 MG TABLET    Take 10 mg by mouth 2 (two) times a day.       Start Date: 12/15/2021End Date: 8/7/2023    IBUPROFEN (ADVIL,MOTRIN) 800 MG TABLET    Take 1 tablet (800 mg total) by mouth every 8 (eight) hours as needed for Pain. Take 1 every 8 hours for 7 days to manage bleeding.       Start Date: 12/1/2022 End Date: 8/7/2023    NITROFURANTOIN, MACROCRYSTAL-MONOHYDRATE, (MACROBID) 100 MG CAPSULE    Take 1 capsule (100 mg total) by mouth 2 (two) times daily.       Start Date: 7/6/2021  End Date: 8/7/2023            Objective:        Vitals:    08/07/23 0852   BP: (!) 157/94   BP Location: Right arm   Patient Position: Sitting   BP Method: Medium (Automatic)   Pulse: 88   Resp: 20   Temp: 98.5 °F (36.9 °C)   TempSrc: Oral   SpO2: 96%   Weight: (!) 168.9 kg (372 lb 6.4 oz)   Height: 5' 4" (1.626 m)       Physical Exam  Vitals reviewed.   Constitutional:       Appearance: Normal appearance.   HENT:      Head: Normocephalic and atraumatic.      Right Ear: Tympanic membrane, ear canal and external ear normal.      Left Ear: Tympanic membrane, ear canal and external ear normal.      Nose: Nose normal.      Mouth/Throat:      Mouth: Mucous membranes are moist.      Pharynx: No oropharyngeal exudate or posterior oropharyngeal erythema.   Eyes:      Extraocular Movements: Extraocular movements intact.      Conjunctiva/sclera: Conjunctivae normal.      Pupils: Pupils are equal, round, and reactive to light.   Cardiovascular:      Rate and Rhythm: Normal rate and regular rhythm.      Pulses: Normal pulses.      Heart sounds: Normal heart sounds.   Pulmonary:      Effort: Pulmonary effort is normal.      Breath sounds: Normal breath sounds.   Abdominal:      General: Bowel sounds are normal.      Palpations: Abdomen is soft.   Musculoskeletal:         General: Normal range of motion.      Cervical back: Normal " range of motion and neck supple.      Comments: Lower extremity numbness and tingling   Skin:     General: Skin is warm and dry.   Neurological:      General: No focal deficit present.      Mental Status: She is alert. Mental status is at baseline.   Psychiatric:         Mood and Affect: Mood normal.         Behavior: Behavior normal.         Thought Content: Thought content normal.         Judgment: Judgment normal.             Lab Results   Component Value Date    WBC 7.60 08/02/2023    HGB 10.4 (L) 08/02/2023    HCT 33.7 (L) 08/02/2023     08/02/2023    ALT 45 08/02/2023    AST 43 (H) 08/02/2023     08/02/2023    K 3.0 (L) 08/02/2023     (H) 08/02/2023    CREATININE 1.03 (H) 08/02/2023    BUN 16 08/02/2023    CO2 30 08/02/2023    INR 1.05 12/31/2022      Assessment:       1. Microcytic anemia    2. Hypokalemia    3. Abnormal vision    4. Type 2 diabetes mellitus with hyperglycemia, without long-term current use of insulin    5. Health care maintenance    6. Neuropathy    7. Essential hypertension, benign    8. Iron deficiency anemia secondary to inadequate dietary iron intake    9. Morbid obesity with BMI of 60.0-69.9, adult    10. Blurry vision    11. Type 2 diabetes mellitus with other ophthalmic complication, without long-term current use of insulin        Plan:         Problem List Items Addressed This Visit          Ophtho    Blurry vision     Optometry referral            Cardiac/Vascular    Essential hypertension, benign     CBC, BMP            Oncology    Iron deficiency anemia secondary to inadequate dietary iron intake     CBC, Fe, TIBC, Ferritin ordered            Endocrine    Morbid obesity with BMI of 60.0-69.9, adult     Weight loss plan and goals given and discussed with patient         Type 2 diabetes mellitus with ophthalmic complication, without long-term current use of insulin     Optometrist referral  Hemoglobin A1c  Metformin reordered  Actos reordered  Lyrica for  neuropathy         Relevant Medications    metFORMIN (GLUCOPHAGE) 500 MG tablet    pioglitazone (ACTOS) 15 MG tablet       Other    Health care maintenance     HIV   Hemoglobin AiC  Hep C Antibody  Lipid panel         Relevant Orders    Lipid Panel    Hepatitis C Antibody    HIV 1/2 Ag/Ab (4th Gen)     Other Visit Diagnoses       Microcytic anemia    -  Primary    Relevant Orders    CBC Auto Differential    Iron and TIBC    Ferritin    Vitamin B12 & Folate    Hypokalemia        Relevant Orders    Basic Metabolic Panel    Abnormal vision        Relevant Orders    Ambulatory referral/consult to Optometry    Type 2 diabetes mellitus with hyperglycemia, without long-term current use of insulin        Relevant Medications    metFORMIN (GLUCOPHAGE) 500 MG tablet    pioglitazone (ACTOS) 15 MG tablet    Other Relevant Orders    Hemoglobin A1C    Neuropathy        Relevant Medications    pregabalin (LYRICA) 50 MG capsule              Follow up in about 1 month (around 9/7/2023).    Chas Ramos MD     Instructed patient that if symptoms fail to improve or worsen patient should seek immediate medical attention or report to the nearest emergency department. Patient expressed verbal agreement and understanding to this plan of care.

## 2023-08-14 ENCOUNTER — TELEPHONE (OUTPATIENT)
Dept: FAMILY MEDICINE | Facility: CLINIC | Age: 33
End: 2023-08-14
Payer: MEDICAID

## 2023-08-14 DIAGNOSIS — E11.42 TYPE 2 DIABETES MELLITUS WITH DIABETIC POLYNEUROPATHY, WITHOUT LONG-TERM CURRENT USE OF INSULIN: ICD-10-CM

## 2023-08-14 DIAGNOSIS — D50.8 IRON DEFICIENCY ANEMIA SECONDARY TO INADEQUATE DIETARY IRON INTAKE: Primary | ICD-10-CM

## 2023-08-14 RX ORDER — FERROUS SULFATE 325(65) MG
325 TABLET, DELAYED RELEASE (ENTERIC COATED) ORAL 2 TIMES DAILY
Qty: 60 TABLET | Refills: 0 | Status: SHIPPED | OUTPATIENT
Start: 2023-08-14 | End: 2023-09-18 | Stop reason: SDUPTHER

## 2023-08-14 RX ORDER — GABAPENTIN 300 MG/1
300 CAPSULE ORAL 3 TIMES DAILY
Qty: 90 CAPSULE | Refills: 11 | Status: SHIPPED | OUTPATIENT
Start: 2023-08-14 | End: 2024-01-22 | Stop reason: SDUPTHER

## 2023-08-14 NOTE — TELEPHONE ENCOUNTER
Patient states when taking Lyrica she does not get relief and would like to try Gabapentin. Patient has Iron deficiency anemia. Ferrous sulfate prescribed. Will follow up with patient in 1 month.

## 2023-09-18 ENCOUNTER — OFFICE VISIT (OUTPATIENT)
Dept: FAMILY MEDICINE | Facility: CLINIC | Age: 33
End: 2023-09-18
Payer: MEDICAID

## 2023-09-18 VITALS
WEIGHT: 293 LBS | SYSTOLIC BLOOD PRESSURE: 147 MMHG | BODY MASS INDEX: 50.02 KG/M2 | DIASTOLIC BLOOD PRESSURE: 94 MMHG | OXYGEN SATURATION: 100 % | HEART RATE: 80 BPM | HEIGHT: 64 IN | TEMPERATURE: 98 F

## 2023-09-18 DIAGNOSIS — E66.01 MORBID OBESITY WITH BMI OF 60.0-69.9, ADULT: ICD-10-CM

## 2023-09-18 DIAGNOSIS — D50.8 IRON DEFICIENCY ANEMIA SECONDARY TO INADEQUATE DIETARY IRON INTAKE: Primary | ICD-10-CM

## 2023-09-18 DIAGNOSIS — E11.39 TYPE 2 DIABETES MELLITUS WITH OTHER OPHTHALMIC COMPLICATION, WITHOUT LONG-TERM CURRENT USE OF INSULIN: ICD-10-CM

## 2023-09-18 DIAGNOSIS — H53.8 BLURRY VISION: ICD-10-CM

## 2023-09-18 DIAGNOSIS — I10 ESSENTIAL HYPERTENSION, BENIGN: ICD-10-CM

## 2023-09-18 PROCEDURE — 3080F PR MOST RECENT DIASTOLIC BLOOD PRESSURE >= 90 MM HG: ICD-10-PCS | Mod: CPTII,,, | Performed by: FAMILY MEDICINE

## 2023-09-18 PROCEDURE — 99212 OFFICE O/P EST SF 10 MIN: CPT | Mod: GC,,, | Performed by: FAMILY MEDICINE

## 2023-09-18 PROCEDURE — 3044F HG A1C LEVEL LT 7.0%: CPT | Mod: CPTII,,, | Performed by: FAMILY MEDICINE

## 2023-09-18 PROCEDURE — 3080F DIAST BP >= 90 MM HG: CPT | Mod: CPTII,,, | Performed by: FAMILY MEDICINE

## 2023-09-18 PROCEDURE — 3008F PR BODY MASS INDEX (BMI) DOCUMENTED: ICD-10-PCS | Mod: CPTII,,, | Performed by: FAMILY MEDICINE

## 2023-09-18 PROCEDURE — 1159F PR MEDICATION LIST DOCUMENTED IN MEDICAL RECORD: ICD-10-PCS | Mod: CPTII,,, | Performed by: FAMILY MEDICINE

## 2023-09-18 PROCEDURE — 3008F BODY MASS INDEX DOCD: CPT | Mod: CPTII,,, | Performed by: FAMILY MEDICINE

## 2023-09-18 PROCEDURE — 1159F MED LIST DOCD IN RCRD: CPT | Mod: CPTII,,, | Performed by: FAMILY MEDICINE

## 2023-09-18 PROCEDURE — 3044F PR MOST RECENT HEMOGLOBIN A1C LEVEL <7.0%: ICD-10-PCS | Mod: CPTII,,, | Performed by: FAMILY MEDICINE

## 2023-09-18 PROCEDURE — 99212 PR OFFICE/OUTPT VISIT, EST, LEVL II, 10-19 MIN: ICD-10-PCS | Mod: GC,,, | Performed by: FAMILY MEDICINE

## 2023-09-18 PROCEDURE — 3077F SYST BP >= 140 MM HG: CPT | Mod: CPTII,,, | Performed by: FAMILY MEDICINE

## 2023-09-18 PROCEDURE — 3077F PR MOST RECENT SYSTOLIC BLOOD PRESSURE >= 140 MM HG: ICD-10-PCS | Mod: CPTII,,, | Performed by: FAMILY MEDICINE

## 2023-09-18 RX ORDER — FERROUS SULFATE 325(65) MG
325 TABLET, DELAYED RELEASE (ENTERIC COATED) ORAL 2 TIMES DAILY
Qty: 60 TABLET | Refills: 0 | Status: SHIPPED | OUTPATIENT
Start: 2023-09-18

## 2023-09-18 NOTE — PROGRESS NOTES
Subjective:       Patient ID: Adriana Deleon is a 32 y.o. female.    Chief Complaint: Follow-up, Diabetes, and Hypertension    This is a 32 y.o female that presents today for T2DM, HTN, PHYLICIA follow up. Patient was prescribed Ferrous Sulfate last visit but was not able to  medication. She was also prescribed Neurontin for shooting pain in bilateral feet she states it has improved. She also complains of hand numbness after work. 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  Patient was told she could benefit from a wrist brace. Patients blood pressure is elevated today on recheck but she states when check at home its normal.  Patient denies HA, fever, fatigue, N/V/D, shortness of breath or chest pain.     Plan for today is to have patient check home bp twice a day and keep a journal of BP. Patient should  wrist brace. Start taking Iron medication. Continue weight loss plan.           Current Outpatient Medications:     ARIPiprazole (ABILIFY) 10 MG Tab, Take 10 mg by mouth once daily., Disp: , Rfl:     benztropine (COGENTIN) 0.5 MG tablet, Take 0.5 mg by mouth once daily., Disp: , Rfl:     buPROPion (WELLBUTRIN SR) 100 MG TBSR 12 hr tablet, Take 100 mg by mouth 2 (two) times daily., Disp: , Rfl:     ferrous sulfate 325 (65 FE) MG EC tablet, Take 1 tablet (325 mg total) by mouth 2 (two) times daily., Disp: 60 tablet, Rfl: 0    gabapentin (NEURONTIN) 300 MG capsule, Take 1 capsule (300 mg total) by mouth 3 (three) times daily., Disp: 90 capsule, Rfl: 11    haloperidoL (HALDOL) 10 MG tablet, Take 10 mg by mouth 2 (two) times daily., Disp: , Rfl:     metFORMIN (GLUCOPHAGE) 500 MG tablet, Take 1 tablet (500 mg total) by mouth 2 (two) times daily., Disp: 180 tablet, Rfl: 3    norethindrone-ethinyl estradiol-iron (MICROGESTIN FE1.5/30) 1.5 mg-30 mcg (21)/75 mg (7) tablet, Take 1  tablet by mouth once daily., Disp: 30 tablet, Rfl: 11    pioglitazone (ACTOS) 15 MG tablet, Take 1 tablet (15 mg total) by mouth once daily., Disp: 90 tablet, Rfl: 3    triamterene-hydrochlorothiazide 37.5-25 mg (MAXZIDE-25) 37.5-25 mg per tablet, Take 1 tablet by mouth once daily., Disp: , Rfl:     TRINTELLIX 10 mg Tab, Take 1 tablet by mouth once daily., Disp: , Rfl:     loratadine (CLARITIN) 10 mg tablet, Take 1 tablet (10 mg total) by mouth once daily., Disp: 30 tablet, Rfl: 2    traZODone (DESYREL) 150 MG tablet, Take 150 mg by mouth nightly as needed., Disp: , Rfl:     Review of patient's allergies indicates:  No Known Allergies    Past Medical History:   Diagnosis Date    Abnormal uterine bleeding     Amenorrhea     Anemia     Depression     Diabetes mellitus, type 2     Dysmenorrhea     NENITA (generalized anxiety disorder)     H/O: depression     Hypertension     Infertility, female     PID (pelvic inflammatory disease)     Sleep apnea, obstructive     STD (sexually transmitted disease)        Past Surgical History:   Procedure Laterality Date     SECTION  2017    Dr. Galeano, [facility] [place]       No family history on file.    Social History     Tobacco Use    Smoking status: Former     Types: Vaping with nicotine    Smokeless tobacco: Never    Tobacco comments:     Stopped x 3 months ago.   Substance Use Topics    Alcohol use: Not Currently    Drug use: Never       Review of Systems   Constitutional:  Negative for activity change, appetite change, chills, diaphoresis, fatigue, fever and unexpected weight change.   HENT:  Negative for nasal congestion, dental problem, drooling, ear discharge, ear pain, facial swelling, hearing loss, mouth sores, nosebleeds, postnasal drip, rhinorrhea, sinus pressure/congestion, sneezing, sore throat, tinnitus, trouble swallowing, voice change and goiter.    Eyes:  Negative for photophobia, discharge, itching and visual disturbance.   Respiratory:  Negative for apnea, cough,  choking, chest tightness, shortness of breath, wheezing and stridor.    Cardiovascular:  Negative for chest pain, palpitations, leg swelling and claudication.   Gastrointestinal:  Negative for abdominal distention, abdominal pain, anal bleeding, blood in stool, change in bowel habit, constipation, diarrhea, nausea, vomiting and change in bowel habit.   Endocrine: Negative for cold intolerance, heat intolerance, polydipsia, polyphagia and polyuria.   Genitourinary:  Negative for bladder incontinence, decreased urine volume, difficulty urinating, dysuria, enuresis, flank pain, frequency, hematuria, nocturia, pelvic pain and urgency.   Musculoskeletal:  Negative for arthralgias, back pain, gait problem, joint swelling, leg pain, myalgias, neck pain, neck stiffness and joint deformity.   Integumentary:  Negative for pallor, rash, wound, breast mass and breast tenderness.   Allergic/Immunologic: Negative for environmental allergies, food allergies and immunocompromised state.   Neurological:  Negative for dizziness, vertigo, tremors, seizures, syncope, facial asymmetry, speech difficulty, weakness, light-headedness, numbness, headaches, coordination difficulties, memory loss and coordination difficulties.   Hematological:  Negative for adenopathy. Does not bruise/bleed easily.   Psychiatric/Behavioral:  Negative for agitation, behavioral problems, confusion, decreased concentration, dysphoric mood, hallucinations, self-injury, sleep disturbance and suicidal ideas. The patient is not nervous/anxious and is not hyperactive.    Breast: Negative for mass and tenderness        Current Medications:   Medication List with Changes/Refills   Current Medications    ARIPIPRAZOLE (ABILIFY) 10 MG TAB    Take 10 mg by mouth once daily.       Start Date: 4/8/2021  End Date: --    BENZTROPINE (COGENTIN) 0.5 MG TABLET    Take 0.5 mg by mouth once daily.       Start Date: 4/8/2021  End Date: --    BUPROPION (WELLBUTRIN SR) 100 MG TBSR 12  HR TABLET    Take 100 mg by mouth 2 (two) times daily.       Start Date: 11/18/2022End Date: --    GABAPENTIN (NEURONTIN) 300 MG CAPSULE    Take 1 capsule (300 mg total) by mouth 3 (three) times daily.       Start Date: 8/14/2023 End Date: 8/13/2024    HALOPERIDOL (HALDOL) 10 MG TABLET    Take 10 mg by mouth 2 (two) times daily.       Start Date: --        End Date: --    LORATADINE (CLARITIN) 10 MG TABLET    Take 1 tablet (10 mg total) by mouth once daily.       Start Date: 7/6/2021  End Date: 10/4/2021    METFORMIN (GLUCOPHAGE) 500 MG TABLET    Take 1 tablet (500 mg total) by mouth 2 (two) times daily.       Start Date: 8/7/2023  End Date: 8/6/2024    NORETHINDRONE-ETHINYL ESTRADIOL-IRON (MICROGESTIN FE1.5/30) 1.5 MG-30 MCG (21)/75 MG (7) TABLET    Take 1 tablet by mouth once daily.       Start Date: 7/10/2023 End Date: --    PIOGLITAZONE (ACTOS) 15 MG TABLET    Take 1 tablet (15 mg total) by mouth once daily.       Start Date: 8/7/2023  End Date: 8/6/2024    TRAZODONE (DESYREL) 150 MG TABLET    Take 150 mg by mouth nightly as needed.       Start Date: 4/8/2021  End Date: --    TRIAMTERENE-HYDROCHLOROTHIAZIDE 37.5-25 MG (MAXZIDE-25) 37.5-25 MG PER TABLET    Take 1 tablet by mouth once daily.       Start Date: --        End Date: --    TRINTELLIX 10 MG TAB    Take 1 tablet by mouth once daily.       Start Date: 4/8/2021  End Date: --   Changed and/or Refilled Medications    Modified Medication Previous Medication    FERROUS SULFATE 325 (65 FE) MG EC TABLET ferrous sulfate 325 (65 FE) MG EC tablet       Take 1 tablet (325 mg total) by mouth 2 (two) times daily.    Take 1 tablet (325 mg total) by mouth 2 (two) times daily.       Start Date: 9/18/2023 End Date: --    Start Date: 8/14/2023 End Date: 9/18/2023            Objective:        Vitals:    09/18/23 1017 09/18/23 1021   BP: (!) 152/92 (!) 147/94   BP Location: Right forearm Right forearm   Patient Position: Sitting Sitting   BP Method: Medium (Automatic) Medium  "(Automatic)   Pulse: 85 80   Temp: 98.1 °F (36.7 °C)    TempSrc: Temporal    SpO2: 100%    Weight: (!) 168.7 kg (372 lb)    Height: 5' 4" (1.626 m)        Physical Exam  Vitals reviewed.   Constitutional:       Appearance: Normal appearance.   HENT:      Head: Normocephalic and atraumatic.      Right Ear: Tympanic membrane, ear canal and external ear normal.      Left Ear: Tympanic membrane, ear canal and external ear normal.      Nose: Nose normal.      Mouth/Throat:      Mouth: Mucous membranes are moist.      Pharynx: No oropharyngeal exudate or posterior oropharyngeal erythema.   Eyes:      Extraocular Movements: Extraocular movements intact.      Conjunctiva/sclera: Conjunctivae normal.      Pupils: Pupils are equal, round, and reactive to light.   Cardiovascular:      Rate and Rhythm: Normal rate and regular rhythm.      Pulses: Normal pulses.      Heart sounds: Normal heart sounds.   Pulmonary:      Effort: Pulmonary effort is normal.      Breath sounds: Normal breath sounds.   Abdominal:      General: Bowel sounds are normal.      Palpations: Abdomen is soft.   Musculoskeletal:         General: Normal range of motion.      Cervical back: Normal range of motion and neck supple.      Comments: Lower extremity numbness and tingling   Skin:     General: Skin is warm and dry.   Neurological:      General: No focal deficit present.      Mental Status: She is alert. Mental status is at baseline.   Psychiatric:         Mood and Affect: Mood normal.         Behavior: Behavior normal.         Thought Content: Thought content normal.         Judgment: Judgment normal.               Lab Results   Component Value Date    WBC 8.72 08/07/2023    HGB 11.7 (L) 08/07/2023    HCT 37.5 (L) 08/07/2023     (H) 08/07/2023    CHOL 123 08/07/2023    TRIG 98 08/07/2023    HDL 36 (L) 08/07/2023    ALT 45 08/02/2023    AST 43 (H) 08/02/2023     08/07/2023    K 3.6 08/07/2023     08/07/2023    CREATININE 1.00 " 08/07/2023    BUN 12 08/07/2023    CO2 28 08/07/2023    INR 1.05 12/31/2022    HGBA1C 6.9 (H) 08/07/2023      Assessment:       1. Iron deficiency anemia secondary to inadequate dietary iron intake    2. Blurry vision    3. Essential hypertension, benign    4. Morbid obesity with BMI of 60.0-69.9, adult    5. Type 2 diabetes mellitus with other ophthalmic complication, without long-term current use of insulin        Plan:         Problem List Items Addressed This Visit          Ophtho    Blurry vision     Seen Dr. Atkinson prescribed glasses for astigmatism            Cardiac/Vascular    Essential hypertension, benign     Monitor BP at home keep journal. Bring BP machine to next visit              Oncology    Iron deficiency anemia secondary to inadequate dietary iron intake - Primary     Ferrous sulfate  Recheck in 3 months         Relevant Medications    ferrous sulfate 325 (65 FE) MG EC tablet       Endocrine    Morbid obesity with BMI of 60.0-69.9, adult     Continue weight loss plan  Patient encouraged to increase daily walking beyond typical day of chores and work         Type 2 diabetes mellitus with ophthalmic complication, without long-term current use of insulin     Recheck Hgb A1C on next visit              Follow up in about 3 months (around 12/18/2023), or LABS, weight, wrist pain.    Chas Ramos MD     Instructed patient that if symptoms fail to improve or worsen patient should seek immediate medical attention or report to the nearest emergency department. Patient expressed verbal agreement and understanding to this plan of care.

## 2023-09-18 NOTE — ASSESSMENT & PLAN NOTE
Continue weight loss plan  Patient encouraged to increase daily walking beyond typical day of chores and work

## 2023-11-06 PROBLEM — Z00.00 HEALTH CARE MAINTENANCE: Status: RESOLVED | Noted: 2023-08-07 | Resolved: 2023-11-06

## 2024-01-22 ENCOUNTER — OFFICE VISIT (OUTPATIENT)
Dept: FAMILY MEDICINE | Facility: CLINIC | Age: 34
End: 2024-01-22
Payer: MEDICAID

## 2024-01-22 VITALS
RESPIRATION RATE: 18 BRPM | TEMPERATURE: 98 F | HEART RATE: 86 BPM | HEIGHT: 64 IN | BODY MASS INDEX: 50.02 KG/M2 | WEIGHT: 293 LBS | OXYGEN SATURATION: 100 % | DIASTOLIC BLOOD PRESSURE: 67 MMHG | SYSTOLIC BLOOD PRESSURE: 90 MMHG

## 2024-01-22 DIAGNOSIS — G56.03 BILATERAL CARPAL TUNNEL SYNDROME: ICD-10-CM

## 2024-01-22 DIAGNOSIS — G56.00 CARPAL TUNNEL SYNDROME, UNSPECIFIED LATERALITY: Primary | ICD-10-CM

## 2024-01-22 DIAGNOSIS — J30.2 SEASONAL ALLERGIES: ICD-10-CM

## 2024-01-22 DIAGNOSIS — E11.42 TYPE 2 DIABETES MELLITUS WITH DIABETIC POLYNEUROPATHY, WITHOUT LONG-TERM CURRENT USE OF INSULIN: ICD-10-CM

## 2024-01-22 DIAGNOSIS — E66.01 MORBID OBESITY WITH BMI OF 60.0-69.9, ADULT: ICD-10-CM

## 2024-01-22 PROCEDURE — 3074F SYST BP LT 130 MM HG: CPT | Mod: CPTII,,, | Performed by: FAMILY MEDICINE

## 2024-01-22 PROCEDURE — 1159F MED LIST DOCD IN RCRD: CPT | Mod: CPTII,,, | Performed by: FAMILY MEDICINE

## 2024-01-22 PROCEDURE — 99213 OFFICE O/P EST LOW 20 MIN: CPT | Mod: ,,, | Performed by: FAMILY MEDICINE

## 2024-01-22 PROCEDURE — 3008F BODY MASS INDEX DOCD: CPT | Mod: CPTII,,, | Performed by: FAMILY MEDICINE

## 2024-01-22 PROCEDURE — 3078F DIAST BP <80 MM HG: CPT | Mod: CPTII,,, | Performed by: FAMILY MEDICINE

## 2024-01-22 RX ORDER — TRAZODONE HYDROCHLORIDE 50 MG/1
50 TABLET ORAL NIGHTLY
COMMUNITY
End: 2024-06-14

## 2024-01-22 RX ORDER — QUETIAPINE FUMARATE 100 MG/1
100 TABLET, FILM COATED ORAL
COMMUNITY
End: 2024-06-14

## 2024-01-22 RX ORDER — POTASSIUM CHLORIDE 20 MEQ/15ML
20 SOLUTION ORAL DAILY
COMMUNITY

## 2024-01-22 RX ORDER — LORATADINE 10 MG/1
10 TABLET ORAL DAILY
Qty: 30 TABLET | Refills: 2 | Status: SHIPPED | OUTPATIENT
Start: 2024-01-22 | End: 2024-04-21

## 2024-01-22 RX ORDER — GABAPENTIN 300 MG/1
300 CAPSULE ORAL 3 TIMES DAILY
Qty: 90 CAPSULE | Refills: 11 | Status: SHIPPED | OUTPATIENT
Start: 2024-01-22 | End: 2024-06-14

## 2024-01-22 NOTE — ASSESSMENT & PLAN NOTE
Patient states her median wrist and first 3 fingers bilaterally have numbness and tingling.  Patient on Neurontin she has relief in feet but none in upper body  PT and orthopedics refferal sent

## 2024-01-22 NOTE — ASSESSMENT & PLAN NOTE
Body mass index is 65.4 kg/m². Morbid obesity complicates all aspects of disease management from diagnostic modalities to treatment. Weight loss encouraged and health benefits explained to patient.

## 2024-01-23 NOTE — PROGRESS NOTES
Subjective:       Patient ID: Adriana Deleon is a 33 y.o. female.    Chief Complaint: Follow-up (Room 1 well side f/u and med refills, c/o numbness and tingling to vin hands)    This is an 33 y.o female who presents today for peripheral neuropathy and wrist pain. Patient states her wrist and first three fingers tingle intermittently. Patient states she has tried Ibuprofen and wrist brace with some relief. Patient is also wanted a full workup because her mother just passed last week suddenly and was really concerned. She was recently seen by Dr. Trujillo and will get her records and follow up with her. She denies HA, fever, fatigue, N/V/D, shortness of breath or chest pain.          Current Outpatient Medications:     ARIPiprazole (ABILIFY) 10 MG Tab, Take 10 mg by mouth once daily., Disp: , Rfl:     benztropine (COGENTIN) 0.5 MG tablet, Take 0.5 mg by mouth once daily., Disp: , Rfl:     buPROPion (WELLBUTRIN SR) 100 MG TBSR 12 hr tablet, Take 100 mg by mouth 2 (two) times daily., Disp: , Rfl:     ferrous sulfate 325 (65 FE) MG EC tablet, Take 1 tablet (325 mg total) by mouth 2 (two) times daily., Disp: 60 tablet, Rfl: 0    haloperidoL (HALDOL) 10 MG tablet, Take 10 mg by mouth 2 (two) times daily., Disp: , Rfl:     metFORMIN (GLUCOPHAGE) 500 MG tablet, Take 1 tablet (500 mg total) by mouth 2 (two) times daily., Disp: 180 tablet, Rfl: 3    pioglitazone (ACTOS) 15 MG tablet, Take 1 tablet (15 mg total) by mouth once daily., Disp: 90 tablet, Rfl: 3    triamterene-hydrochlorothiazide 37.5-25 mg (MAXZIDE-25) 37.5-25 mg per tablet, Take 1 tablet by mouth once daily., Disp: , Rfl:     TRINTELLIX 10 mg Tab, Take 1 tablet by mouth once daily., Disp: , Rfl:     gabapentin (NEURONTIN) 300 MG capsule, Take 1 capsule (300 mg total) by mouth 3 (three) times daily., Disp: 90 capsule, Rfl: 11    loratadine (CLARITIN) 10 mg tablet, Take 1 tablet (10 mg total) by mouth once daily., Disp: 30 tablet, Rfl: 2    norethindrone-ethinyl  estradiol-iron (MICROGESTIN FE1.5/30) 1.5 mg-30 mcg (21)/75 mg (7) tablet, Take 1 tablet by mouth once daily. (Patient not taking: Reported on 2024), Disp: 30 tablet, Rfl: 11    potassium chloride 10% (KAYCIEL) 20 mEq/15 mL oral solution, Take 20 mEq by mouth once daily., Disp: , Rfl:     QUEtiapine (SEROQUEL) 100 MG Tab, Take 100 mg by mouth., Disp: , Rfl:     traZODone (DESYREL) 50 MG tablet, Take 50 mg by mouth every evening., Disp: , Rfl:     Review of patient's allergies indicates:  No Known Allergies    Past Medical History:   Diagnosis Date    Abnormal uterine bleeding     Amenorrhea     Anemia     Depression     Diabetes mellitus, type 2     Dysmenorrhea     NENITA (generalized anxiety disorder)     H/O: depression     Hypertension     Infertility, female     PID (pelvic inflammatory disease)     Sleep apnea, obstructive     STD (sexually transmitted disease)        Past Surgical History:   Procedure Laterality Date     SECTION  2017    Dr. Galeano, [facility] [place]       History reviewed. No pertinent family history.    Social History     Tobacco Use    Smoking status: Former     Types: Vaping with nicotine    Smokeless tobacco: Never    Tobacco comments:     Stopped x 3 months ago.   Substance Use Topics    Alcohol use: Not Currently    Drug use: Never       Review of Systems   Constitutional:  Negative for fatigue and fever.   HENT:  Negative for nasal congestion, hearing loss, mouth dryness, sinus pressure/congestion, sneezing and sore throat.    Eyes:  Negative for pain and discharge.   Respiratory:  Negative for cough and shortness of breath.    Cardiovascular:  Negative for chest pain and claudication.   Gastrointestinal:  Negative for abdominal distention, abdominal pain, anal bleeding, change in bowel habit, constipation and diarrhea.   Endocrine: Negative for cold intolerance and heat intolerance.   Genitourinary:  Negative for bladder incontinence, difficulty urinating, dyspareunia, dysuria,  genital sores and hot flashes.   Musculoskeletal:  Positive for arthralgias. Negative for back pain, joint swelling, leg pain and joint deformity.   Integumentary:  Negative for rash and wound.   Allergic/Immunologic: Negative for environmental allergies, food allergies and immunocompromised state.   Neurological:  Negative for light-headedness, headaches and coordination difficulties.   Hematological: Negative.    Psychiatric/Behavioral: Negative.           Current Medications:   Medication List with Changes/Refills   Current Medications    ARIPIPRAZOLE (ABILIFY) 10 MG TAB    Take 10 mg by mouth once daily.       Start Date: 4/8/2021  End Date: --    BENZTROPINE (COGENTIN) 0.5 MG TABLET    Take 0.5 mg by mouth once daily.       Start Date: 4/8/2021  End Date: --    BUPROPION (WELLBUTRIN SR) 100 MG TBSR 12 HR TABLET    Take 100 mg by mouth 2 (two) times daily.       Start Date: 11/18/2022End Date: --    FERROUS SULFATE 325 (65 FE) MG EC TABLET    Take 1 tablet (325 mg total) by mouth 2 (two) times daily.       Start Date: 9/18/2023 End Date: --    HALOPERIDOL (HALDOL) 10 MG TABLET    Take 10 mg by mouth 2 (two) times daily.       Start Date: --        End Date: --    METFORMIN (GLUCOPHAGE) 500 MG TABLET    Take 1 tablet (500 mg total) by mouth 2 (two) times daily.       Start Date: 8/7/2023  End Date: 8/6/2024    NORETHINDRONE-ETHINYL ESTRADIOL-IRON (MICROGESTIN FE1.5/30) 1.5 MG-30 MCG (21)/75 MG (7) TABLET    Take 1 tablet by mouth once daily.       Start Date: 7/10/2023 End Date: --    PIOGLITAZONE (ACTOS) 15 MG TABLET    Take 1 tablet (15 mg total) by mouth once daily.       Start Date: 8/7/2023  End Date: 8/6/2024    POTASSIUM CHLORIDE 10% (KAYCIEL) 20 MEQ/15 ML ORAL SOLUTION    Take 20 mEq by mouth once daily.       Start Date: --        End Date: --    QUETIAPINE (SEROQUEL) 100 MG TAB    Take 100 mg by mouth.       Start Date: --        End Date: --    TRAZODONE (DESYREL) 50 MG TABLET    Take 50 mg by mouth  "every evening.       Start Date: --        End Date: --    TRIAMTERENE-HYDROCHLOROTHIAZIDE 37.5-25 MG (MAXZIDE-25) 37.5-25 MG PER TABLET    Take 1 tablet by mouth once daily.       Start Date: --        End Date: --    TRINTELLIX 10 MG TAB    Take 1 tablet by mouth once daily.       Start Date: 4/8/2021  End Date: --   Changed and/or Refilled Medications    Modified Medication Previous Medication    GABAPENTIN (NEURONTIN) 300 MG CAPSULE gabapentin (NEURONTIN) 300 MG capsule       Take 1 capsule (300 mg total) by mouth 3 (three) times daily.    Take 1 capsule (300 mg total) by mouth 3 (three) times daily.       Start Date: 1/22/2024 End Date: 1/21/2025    Start Date: 8/14/2023 End Date: 1/22/2024    LORATADINE (CLARITIN) 10 MG TABLET loratadine (CLARITIN) 10 mg tablet       Take 1 tablet (10 mg total) by mouth once daily.    Take 1 tablet (10 mg total) by mouth once daily.       Start Date: 1/22/2024 End Date: 4/21/2024    Start Date: 7/6/2021  End Date: 1/22/2024            Objective:        Vitals:    01/22/24 0928   BP: 90/67   BP Location: Left arm   Patient Position: Sitting   BP Method: Large (Automatic)   Pulse: 86   Resp: 18   Temp: 98.1 °F (36.7 °C)   TempSrc: Oral   SpO2: 100%   Weight: (!) 172.8 kg (381 lb)   Height: 5' 4" (1.626 m)       Physical Exam  Constitutional:       Appearance: She is obese.   HENT:      Head: Normocephalic and atraumatic.      Right Ear: Tympanic membrane normal.      Left Ear: Tympanic membrane normal.      Nose: Nose normal.      Mouth/Throat:      Mouth: Mucous membranes are moist.      Pharynx: Oropharynx is clear.   Eyes:      Conjunctiva/sclera: Conjunctivae normal.      Pupils: Pupils are equal, round, and reactive to light.   Cardiovascular:      Rate and Rhythm: Normal rate and regular rhythm.      Pulses: Normal pulses.      Heart sounds: Normal heart sounds.   Pulmonary:      Effort: Pulmonary effort is normal.      Breath sounds: Normal breath sounds.   Abdominal:     "  General: Abdomen is flat. Bowel sounds are normal.   Genitourinary:     General: Normal vulva.   Musculoskeletal:         General: Tenderness present. Normal range of motion.      Cervical back: Normal range of motion.   Skin:     General: Skin is warm and dry.      Capillary Refill: Capillary refill takes less than 2 seconds.   Neurological:      General: No focal deficit present.      Mental Status: She is alert and oriented to person, place, and time.   Psychiatric:         Mood and Affect: Mood normal.               Lab Results   Component Value Date    WBC 8.72 08/07/2023    HGB 11.7 (L) 08/07/2023    HCT 37.5 (L) 08/07/2023     (H) 08/07/2023    CHOL 123 08/07/2023    TRIG 98 08/07/2023    HDL 36 (L) 08/07/2023    ALT 45 08/02/2023    AST 43 (H) 08/02/2023     08/07/2023    K 3.6 08/07/2023     08/07/2023    CREATININE 1.00 08/07/2023    BUN 12 08/07/2023    CO2 28 08/07/2023    INR 1.05 12/31/2022    HGBA1C 6.9 (H) 08/07/2023      Assessment:       1. Carpal tunnel syndrome, unspecified laterality    2. Type 2 diabetes mellitus with diabetic polyneuropathy, without long-term current use of insulin    3. Seasonal allergies    4. Bilateral carpal tunnel syndrome    5. Morbid obesity with BMI of 60.0-69.9, adult        Plan:         Problem List Items Addressed This Visit          Neuro    Bilateral carpal tunnel syndrome     Patient states her median wrist and first 3 fingers bilaterally have numbness and tingling.  Patient on Neurontin she has relief in feet but none in upper body  PT and orthopedics refferal sent            ENT    Seasonal allergies    Relevant Medications    loratadine (CLARITIN) 10 mg tablet       Endocrine    Morbid obesity with BMI of 60.0-69.9, adult     Body mass index is 65.4 kg/m². Morbid obesity complicates all aspects of disease management from diagnostic modalities to treatment. Weight loss encouraged and health benefits explained to patient.            Other  Visit Diagnoses       Carpal tunnel syndrome, unspecified laterality    -  Primary    Relevant Orders    Ambulatory referral/consult to Physical/Occupational Therapy    Ambulatory referral/consult to Orthopedics    Type 2 diabetes mellitus with diabetic polyneuropathy, without long-term current use of insulin        Relevant Medications    gabapentin (NEURONTIN) 300 MG capsule              Follow up in about 3 months (around 4/22/2024), or DM, carpal tunnel,.    Chas Ramos MD     Instructed patient that if symptoms fail to improve or worsen patient should seek immediate medical attention or report to the nearest emergency department. Patient expressed verbal agreement and understanding to this plan of care.

## 2024-02-05 ENCOUNTER — CLINICAL SUPPORT (OUTPATIENT)
Dept: REHABILITATION | Facility: HOSPITAL | Age: 34
End: 2024-02-05
Payer: MEDICAID

## 2024-02-05 DIAGNOSIS — G56.00 CARPAL TUNNEL SYNDROME, UNSPECIFIED LATERALITY: ICD-10-CM

## 2024-02-05 PROCEDURE — 97165 OT EVAL LOW COMPLEX 30 MIN: CPT

## 2024-02-05 NOTE — PROGRESS NOTES
[facility] Occupational Therapy  Initial Evaluation     Date: 2024  Name: Adriana Deleon  Clinic Number: [id number]    Therapy Diagnosis: No diagnosis found.  Physician: Chas Ramos MD    Physician Orders: Evaluate and treat.  Medical Diagnosis: Carpal tunnel syndrome, unspecified laterality [G56.00]   Date of Injury/Onset:   Evaluation Date: 2024  Insurance Authorization Period Expiration: 2024 - 2025   Plan of Care Certification Period: 2024-2024      Visit # / Visits authorized:     FOTO: initial eval  Medicare Amount:     Time In:4:59  Time Out: 5:30  Total treatment time: 31minutes      Precautions:  Standard    Subjective     Involved Side: BUE  Dominant Side: Right  Date of Onset:   History of Current Condition: Pt. Reports she has been having pain in bilateral hands since 2018. Reports at first she just pain but now she has consistent numbness/tingling in bilateral hands. Reports she saw  In October who stated she may have carpal tunnel and could be neuropathy. She recently was referred to therapy. She currently has difficulty feeling items and picking them up. She has pain with phalens test. She did demonstrate difficulty picking up small items with hand due to decreased sensation. She is suppose to see otrho tomorrow. I recommend she have nerve conduction study performed.      Past Medical History/Physical Systems Review:   Adriana Deleon  has a past medical history of Abnormal uterine bleeding, Amenorrhea, Anemia, Depression, Diabetes mellitus, type 2, Dysmenorrhea, NENITA (generalized anxiety disorder), H/O: depression, Hypertension, Infertility, female, PID (pelvic inflammatory disease), Sleep apnea, obstructive, and STD (sexually transmitted disease).    Adriana Deleon  has a past surgical history that includes  section (2017).    Adriana has a current medication list which includes the following prescription(s): aripiprazole,  benztropine, bupropion, ferrous sulfate, gabapentin, haloperidol, loratadine, metformin, norethindrone-ethinyl estradiol-iron, pioglitazone, potassium chloride 10%, quetiapine, trazodone, triamterene-hydrochlorothiazide 37.5-25 mg, and trintellix.    Review of patient's allergies indicates:  No Known Allergies     Patient's Goals for Therapy: To be able to use hands again.    Pain:  Functional Pain Scale Rating 0-10:   7/10 on average  5/10 at best  10/10 at worst  Location: Bilateral wrists/hands  Description: Aching, Dull, Burning, Throbbing, Grabbing, Tight, Tingling, and Numb  Aggravating Factors: Night Time, Morning, Extension, Flexing, and Lifting  Easing Factors: nothing    Occupation:  Unemployed  Working presently: unemployed      Functional Limitations/Social History:    Previous functional status includes: Independent with all ADLs.     Current FunctionalStatus   Home/Living environment : lives with their son      Limitation of Functional Status as follows:   ADLs/IADLs:     - Feeding: Mod. (I)    - Bathing: Mod.(I)    - Dressing/Grooming: Mod. (I)    - Driving: Mod. (I)             Objective     Observation/Appearance:     Edema. Measured in centimeters.   2/5/2024 2/5/2024    Right Left   2in. Above elbow     2in. Below elbow     Wrist Crease     Distal Palmar Crease     MCPs       Edema. Measured in centimeters.   2/5/2024 2/5/2024    Right Left   Index:       P1      PIP     P2      DIP     P3     Long:       P1      PIP     P2      DIP     P3     Ring:       P1                 PIP                P2                  DIP     P3     Small:        P1                 PIP            P2             DIP     P3     Thumb:     Prox. Phalanx     IP     Distal Phalanx       Elbow and Wrist ROM. Measured in degrees.   2/5/2024 2/5/2024    Right Left   Wrist Ext/Flex 61/60 57/51     Hand ROM. Measured in degrees.   2/5/2024 2/5/2024    Right Left        Index: MP  62 64              PIP     90 93              DIP  60 51              PENA          Long:  MP 69 64              PIP 92 97              DIP 53 69              PENA          Ring:   MP 71 62               99              DIP 25 55              PENA          Small:  MP 72 63               PIP 91 91               DIP 30 50              PENA          Thumb: MP 46 44                IP 73 54      Strength (Dynamometer) and Pinch Strength (Pinch Gauge)  Measured in pounds.   2/5/2024 2/5/2024    Right Left   Rung II 17 27   Key Pinch 4 11   3pt Pinch 8 12   2pt Pinch 6 11       Manual Muscle Test   2/5/2024 2/5/2024    Right Left   Wrist Extension  4/5 4/5   Wrist Flexion 4/5 4/5         Special Tests  Thumb CMC Grind Test    Finkelstein's Test    Phalen's Test    Tinel's Test    Rolo's Test    [place] Test    Digital Collateral Stress Test    ORL Test    Froment's Sign    Pinch OK Sign    Janessa's Sign     [place] Sign     Clamp Sign     SL Ballottement Test    LT Ballottement Test    Scaphoid/WatsonTest    Linscheid's Test    Metacarpal Stress Test    Piano Key Test    ECU Synergy Test    Ulnar Compression Test    TFCC Load Test    Ulnocarpal Stress Test    Midcarpal Shift Test    Pisiform Boost Test    Elbow Flexion Test    Scratch Collapse Test    Tennis Elbow Test    Resisted Middle Finger Extension Test    Mills Test    Chair Test    Biceps Squeeze Test    Biceps Hook Test    Milking Test    Press Up Manuever    PLRI Test    Valgus Stress Test    Varus Stress Test    Spurling Test    Cervical Distraction Test    ULNTT - General    ULNTT - Median Nerve    ULNTT - Radial Nerve    ULNTT - Ulnar Nerve         CMS Impairment/Limitation/Restriction for FOTO  Survey    Therapist reviewed FOTO scores for Adriana Deleon on 2/5/2024.   FOTO documents entered into [employer] - see Media section.    Limitation Score: 44%         Treatment     Treatment Time In: 4:59  Treatment Time Out: 5:30      ADRIANA received the following supervised modalities after being cleared for  contradictions for  minutes:   -N/A    ADRIANA received the following manual therapy techniques for  minutes:   -N/A    ADRIANA received therapeutic exercises for  minutes including:  -N/A    Home Exercise Program/Education:  Issued HEP (see patient instructions in EMR) and educated on modality use for pain management . Exercises were reviewed and ADRIANA was able to demonstrate them prior to the end of the session.   Pt received a written copy of exercises to perform at home. ADRIANA demonstrated good  understanding of the education provided.  Pt was advised to perform these exercises free of pain, and to stop performing them if pain occurs.    Patient/Family Education: role of OT, goals for OT, scheduling/cancellations - pt verbalized understanding. Discussed insurance limitations with patient.    Additional Education provided:     Assessment     Adriana Deleon is a 33 y.o. female referred to outpatient occupational therapy and presents with a medical diagnosis of bilateral carpal tunnel syndrome, resulting in decreased ROM/strength with increased pain and consistent numbness/tingling and demonstrates limitations as described above. Following medical record review it is determined that pt will benefit from occupational therapy services in order to maximize pain free and/or functional use of bilateral UE. The following goals were discussed with the patient and patient is in agreement with them as to be addressed in the treatment plan. The patient's rehab potential is Fair.     Anticipated barriers to occupational therapy:   Pt has no cultural, educational or language barriers to learning provided.        Goals:   The following goals were discussed with the patient and patient is in agreement with them as to be addressed in the treatment plan.   Short term Goals:  1) Initiate HEP  2) Pt will increase AROM of BUE by 5-10 degrees in order to assist with functional full fist by 4 weeks.  3) Pt will reduce edema by .5-1 cm  in affected fingers by 4 weeks.  4) Pt will reduce pain to less than 4/10 by 4 weeks.  5) Pt will increase functional  strength by 5# in order to A in opening containers for med management or home management tasks by 4 weeks.   6) Patient will be able to achieve less than or equal to 44% on the FOTO, demonstrating overall improved functional ability with upper extremity. (Self-care category)    Long Term Goals:  Goals to be met by discharge:  1) Independent with HEP  2) Pt will increase BUE PENA by 20-30 degrees in order to increase functional fist for grasp with home management or work related tasks by d/c.   3) Pt will decrease edema to trace or none to increase functional ROM by d/c.   4) Pt will decrease pain to trace or none while completing light home management tasks or work related tasks by d/c.   5) Patient will be able to achieve less than or equal to 60% on the FOTO, demonstrating overall improved functional ability with upper extremity.  (Self-care category)        Plan   Certification Period/Plan of care expiration: 2/5/2024 to 4/1/2024.    Outpatient Occupational Therapy 2 times weekly for 8 weeks to include the following interventions: Paraffin, Fluidotherapy, Manual therapy/joint mobilizations, Modalities for pain management, US 3 mhz, Therapeutic exercises/activities., Iontophoresis with 2.0 cc Dexamethasone, Strengthening, Orthotic Fabrication/Fit/Training, Electrical Modalities, Joint Protection, Energy Conservation, and Therapeutic activity.      KAITLIN ANDREWS, OT

## 2024-02-06 ENCOUNTER — HOSPITAL ENCOUNTER (EMERGENCY)
Facility: HOSPITAL | Age: 34
Discharge: HOME | End: 2024-02-06
Attending: EMERGENCY MEDICINE
Payer: MEDICAID

## 2024-02-06 ENCOUNTER — OFFICE VISIT (OUTPATIENT)
Dept: ORTHOPEDICS | Facility: CLINIC | Age: 34
End: 2024-02-06
Payer: MEDICAID

## 2024-02-06 VITALS
OXYGEN SATURATION: 97 % | HEART RATE: 91 BPM | WEIGHT: 293 LBS | SYSTOLIC BLOOD PRESSURE: 138 MMHG | DIASTOLIC BLOOD PRESSURE: 84 MMHG | BODY MASS INDEX: 66.6 KG/M2 | TEMPERATURE: 99 F | RESPIRATION RATE: 16 BRPM

## 2024-02-06 DIAGNOSIS — G56.00 CARPAL TUNNEL SYNDROME, UNSPECIFIED LATERALITY: ICD-10-CM

## 2024-02-06 DIAGNOSIS — T14.90XA INJURY: ICD-10-CM
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
## 2024-02-06 DIAGNOSIS — S43.402A SPRAIN OF LEFT SHOULDER, UNSPECIFIED SHOULDER SPRAIN TYPE, INITIAL ENCOUNTER: ICD-10-CM

## 2024-02-06 PROCEDURE — 99283 EMERGENCY DEPT VISIT LOW MDM: CPT | Mod: 25

## 2024-02-06 PROCEDURE — 20526 THER INJECTION CARP TUNNEL: CPT | Mod: PBBFAC,RT

## 2024-02-06 PROCEDURE — 99284 EMERGENCY DEPT VISIT MOD MDM: CPT | Mod: ,,, | Performed by: EMERGENCY MEDICINE

## 2024-02-06 PROCEDURE — 25000003 PHARM REV CODE 250: Performed by: EMERGENCY MEDICINE

## 2024-02-06 PROCEDURE — 99212 OFFICE O/P EST SF 10 MIN: CPT | Mod: PBBFAC,25

## 2024-02-06 PROCEDURE — 99203 OFFICE O/P NEW LOW 30 MIN: CPT | Mod: S$PBB,25,,

## 2024-02-06 PROCEDURE — G0390 TRAUMA RESPONS W/HOSP CRITI: HCPCS

## 2024-02-06 PROCEDURE — 20526 THER INJECTION CARP TUNNEL: CPT | Mod: S$PBB,RT,,

## 2024-02-06 PROCEDURE — 99999PBSHW PR PBB SHADOW TECHNICAL ONLY FILED TO HB: Mod: PBBFAC,,,

## 2024-02-06 RX ORDER — TRIAMCINOLONE ACETONIDE 40 MG/ML
20 INJECTION, SUSPENSION INTRA-ARTICULAR; INTRAMUSCULAR
Status: DISCONTINUED | OUTPATIENT
Start: 2024-02-06 | End: 2024-02-06 | Stop reason: HOSPADM

## 2024-02-06 RX ORDER — BUPIVACAINE HYDROCHLORIDE 2.5 MG/ML
0.5 INJECTION, SOLUTION EPIDURAL; INFILTRATION; INTRACAUDAL
Status: DISCONTINUED | OUTPATIENT
Start: 2024-02-06 | End: 2024-02-06 | Stop reason: HOSPADM

## 2024-02-06 RX ORDER — CYCLOBENZAPRINE HCL 10 MG
10 TABLET ORAL
Status: COMPLETED | OUTPATIENT
Start: 2024-02-06 | End: 2024-02-06

## 2024-02-06 RX ADMIN — TRIAMCINOLONE ACETONIDE 20 MG: 40 INJECTION, SUSPENSION INTRA-ARTICULAR; INTRAMUSCULAR at 11:02

## 2024-02-06 RX ADMIN — CYCLOBENZAPRINE 10 MG: 10 TABLET, FILM COATED ORAL at 10:02

## 2024-02-06 RX ADMIN — BUPIVACAINE HYDROCHLORIDE 0.5 ML: 2.5 INJECTION, SOLUTION EPIDURAL; INFILTRATION; INTRACAUDAL at 11:02

## 2024-02-06 NOTE — PATIENT INSTRUCTIONS
Bilateral carpal tunnel syndrome.  Worsening over the past month.  She is tried conservative management which has worked for her left wrist at not her right.  Discussed all treatment options.  Discussed possible steroid injection today.  Discussed that these can be repeated every 3-4 months if necessary.  Discussed continuation of conservative management.  Follow up p.r.n..

## 2024-02-06 NOTE — PROGRESS NOTES
CC:   Chief Complaint   Patient presents with    Left Hand - Pain    Right Hand - Pain          Adriana Deleon is a 33 y.o. female seen today for bilateral hand pain.  Patient states that she has had numbness and tingling in both hands and fingers for the past several years it is worsened over the past month and now she is having pain in her wrist and hands.  Numbness and tingling is present in the 1st through 4th fingers of both hands.  She says that the pain wakes her up at night.  She says it is alleviated with sitting or standing up and moving around.  She is tried wearing splints at night and taking ibuprofen, she states that this helps the left hand more than the right hand.  No other complaints today.    PAST MEDICAL HISTORY:   Past Medical History:   Diagnosis Date    Abnormal uterine bleeding     Amenorrhea     Anemia     Depression     Diabetes mellitus, type 2     Dysmenorrhea     NENITA (generalized anxiety disorder)     H/O: depression     Hypertension     Infertility, female     PID (pelvic inflammatory disease)     Sleep apnea, obstructive     STD (sexually transmitted disease)           PAST SURGICAL HISTORY:   Past Surgical History:   Procedure Laterality Date     SECTION  2017    Dr. Galeano, [facility] [place]          ALLERGIES: Review of patient's allergies indicates:  No Known Allergies     MEDICATIONS :    Current Outpatient Medications:     ARIPiprazole (ABILIFY) 10 MG Tab, Take 10 mg by mouth once daily., Disp: , Rfl:     benztropine (COGENTIN) 0.5 MG tablet, Take 0.5 mg by mouth once daily., Disp: , Rfl:     buPROPion (WELLBUTRIN SR) 100 MG TBSR 12 hr tablet, Take 100 mg by mouth 2 (two) times daily., Disp: , Rfl:     ferrous sulfate 325 (65 FE) MG EC tablet, Take 1 tablet (325 mg total) by mouth 2 (two) times daily., Disp: 60 tablet, Rfl: 0    gabapentin (NEURONTIN) 300 MG capsule, Take 1 capsule (300 mg total) by mouth 3 (three) times daily., Disp: 90 capsule, Rfl: 11     haloperidoL (HALDOL) 10 MG tablet, Take 10 mg by mouth 2 (two) times daily., Disp: , Rfl:     loratadine (CLARITIN) 10 mg tablet, Take 1 tablet (10 mg total) by mouth once daily., Disp: 30 tablet, Rfl: 2    metFORMIN (GLUCOPHAGE) 500 MG tablet, Take 1 tablet (500 mg total) by mouth 2 (two) times daily., Disp: 180 tablet, Rfl: 3    norethindrone-ethinyl estradiol-iron (MICROGESTIN FE1.5/30) 1.5 mg-30 mcg (21)/75 mg (7) tablet, Take 1 tablet by mouth once daily. (Patient not taking: Reported on 1/22/2024), Disp: 30 tablet, Rfl: 11    pioglitazone (ACTOS) 15 MG tablet, Take 1 tablet (15 mg total) by mouth once daily., Disp: 90 tablet, Rfl: 3    potassium chloride 10% (KAYCIEL) 20 mEq/15 mL oral solution, Take 20 mEq by mouth once daily., Disp: , Rfl:     QUEtiapine (SEROQUEL) 100 MG Tab, Take 100 mg by mouth., Disp: , Rfl:     traZODone (DESYREL) 50 MG tablet, Take 50 mg by mouth every evening., Disp: , Rfl:     triamterene-hydrochlorothiazide 37.5-25 mg (MAXZIDE-25) 37.5-25 mg per tablet, Take 1 tablet by mouth once daily., Disp: , Rfl:     TRINTELLIX 10 mg Tab, Take 1 tablet by mouth once daily., Disp: , Rfl:      SOCIAL HISTORY:   Social History     Socioeconomic History    Marital status: Single   Tobacco Use    Smoking status: Former     Types: Vaping with nicotine    Smokeless tobacco: Never    Tobacco comments:     Stopped x 3 months ago.   Substance and Sexual Activity    Alcohol use: Not Currently    Drug use: Never    Sexual activity: Not Currently     Partners: Male        FAMILY HISTORY: No family history on file.       PHYSICAL EXAM:      There were no vitals filed for this visit.  There is no height or weight on file to calculate BMI.    GENERAL: Well-developed, well-nourished female . The patient is alert, oriented and cooperative.    HEENT:  Normocephalic, atraumatic.  Extraocular movements are intact bilaterally.     NECK:  Nontender with good range of motion.    LUNGS:  Clear to auscultation  bilaterally.    HEART:  Regular rate and rhythm.     ABDOMEN:  Soft, non-tender, non-distended.      EXTREMITIES:  Bilateral wrist with skin clean dry and intact, no soft tissue swelling over wrist, good range of motion and normal strength, neurovascularly intact      RADIOGRAPHIC FINDINGS:   No results found.     Patient Active Problem List    Diagnosis Date Noted    Bilateral carpal tunnel syndrome 01/22/2024    Iron deficiency anemia secondary to inadequate dietary iron intake 08/07/2023    Blurry vision 08/07/2023    Type 2 diabetes mellitus with ophthalmic complication, without long-term current use of insulin 08/07/2023    Breakthrough bleeding on Depo-Provera 02/21/2023    Morbid obesity with BMI of 60.0-69.9, adult 12/02/2022    DUB (dysfunctional uterine bleeding) 12/02/2022    Dysuria 07/06/2021    Urinary tract infection without hematuria 07/06/2021    Essential hypertension, benign 07/06/2021    Seasonal allergies 07/06/2021     IMPRESSION AND PLAN:  Bilateral carpal tunnel syndrome.  Worsening over the past month.  She is tried conservative management which has worked for her left wrist at not her right.  Discussed all treatment options.  Discussed possible steroid injection today.  Discussed that these can be repeated every 3-4 months if necessary.  Discussed continuation of conservative management.  Follow up p.r.n..    No follow-ups on file.       Fanny Shelton PA-C      (Subject to voice recognition error, transcription service not allowed)

## 2024-02-06 NOTE — PROCEDURES
Carpal Tunnel    Date/Time: 2/6/2024 11:00 AM    Performed by: Fanny Shelton PA  Authorized by: Fanny Shelton PA    Indications:  Pain  Site marked: the procedure site was marked    Location: RIGHT WRIST.  Needle size:  25 G  Approach:  Volar  Medications:  0.5 mL BUPivacaine (PF) 0.25% (2.5 mg/ml) 0.25 % (2.5 mg/mL); 20 mg triamcinolone acetonide 40 mg/mL  Patient tolerance:  Patient tolerated the procedure well with no immediate complications

## 2024-02-06 NOTE — PLAN OF CARE
[facility] Occupational Therapy  Initial Evaluation     Date: 2024  Name: Adriana Deleon  Clinic Number: [id number]    Therapy Diagnosis: No diagnosis found.  Physician: Chas Ramos MD    Physician Orders: Evaluate and treat.  Medical Diagnosis: Carpal tunnel syndrome, unspecified laterality [G56.00]   Date of Injury/Onset:   Evaluation Date: 2024  Insurance Authorization Period Expiration: 2024 - 2025   Plan of Care Certification Period: 2024-2024      Visit # / Visits authorized:     FOTO: initial eval  Medicare Amount:     Time In:4:59  Time Out: 5:30  Total treatment time: 31minutes      Precautions:  Standard    Subjective     Involved Side: BUE  Dominant Side: Right  Date of Onset:   History of Current Condition: Pt. Reports she has been having pain in bilateral hands since 2018. Reports at first she just pain but now she has consistent numbness/tingling in bilateral hands. Reports she saw  In October who stated she may have carpal tunnel and could be neuropathy. She recently was referred to therapy. She currently has difficulty feeling items and picking them up. She has pain with phalens test. She did demonstrate difficulty picking up small items with hand due to decreased sensation. She is suppose to see otrho tomorrow. I recommend she have nerve conduction study performed.      Past Medical History/Physical Systems Review:   Adriana Deleon  has a past medical history of Abnormal uterine bleeding, Amenorrhea, Anemia, Depression, Diabetes mellitus, type 2, Dysmenorrhea, NENITA (generalized anxiety disorder), H/O: depression, Hypertension, Infertility, female, PID (pelvic inflammatory disease), Sleep apnea, obstructive, and STD (sexually transmitted disease).    Adriana Deleon  has a past surgical history that includes  section (2017).    Adriana has a current medication list which includes the following prescription(s): aripiprazole,  benztropine, bupropion, ferrous sulfate, gabapentin, haloperidol, loratadine, metformin, norethindrone-ethinyl estradiol-iron, pioglitazone, potassium chloride 10%, quetiapine, trazodone, triamterene-hydrochlorothiazide 37.5-25 mg, and trintellix.    Review of patient's allergies indicates:  No Known Allergies     Patient's Goals for Therapy: To be able to use hands again.    Pain:  Functional Pain Scale Rating 0-10:   7/10 on average  5/10 at best  10/10 at worst  Location: Bilateral wrists/hands  Description: Aching, Dull, Burning, Throbbing, Grabbing, Tight, Tingling, and Numb  Aggravating Factors: Night Time, Morning, Extension, Flexing, and Lifting  Easing Factors: nothing    Occupation:  Unemployed  Working presently: unemployed      Functional Limitations/Social History:    Previous functional status includes: Independent with all ADLs.     Current FunctionalStatus   Home/Living environment : lives with their son      Limitation of Functional Status as follows:   ADLs/IADLs:     - Feeding: Mod. (I)    - Bathing: Mod.(I)    - Dressing/Grooming: Mod. (I)    - Driving: Mod. (I)             Objective     Observation/Appearance:     Edema. Measured in centimeters.   2/5/2024 2/5/2024    Right Left   2in. Above elbow     2in. Below elbow     Wrist Crease     Distal Palmar Crease     MCPs       Edema. Measured in centimeters.   2/5/2024 2/5/2024    Right Left   Index:       P1      PIP     P2      DIP     P3     Long:       P1      PIP     P2      DIP     P3     Ring:       P1                 PIP                P2                  DIP     P3     Small:        P1                 PIP            P2             DIP     P3     Thumb:     Prox. Phalanx     IP     Distal Phalanx       Elbow and Wrist ROM. Measured in degrees.   2/5/2024 2/5/2024    Right Left   Wrist Ext/Flex 61/60 57/51     Hand ROM. Measured in degrees.   2/5/2024 2/5/2024    Right Left        Index: MP  62 64              PIP     90 93              DIP  60 51              PENA          Long:  MP 69 64              PIP 92 97              DIP 53 69              PENA          Ring:   MP 71 62               99              DIP 25 55              PENA          Small:  MP 72 63               PIP 91 91               DIP 30 50              PENA          Thumb: MP 46 44                IP 73 54      Strength (Dynamometer) and Pinch Strength (Pinch Gauge)  Measured in pounds.   2/5/2024 2/5/2024    Right Left   Rung II 17 27   Key Pinch 4 11   3pt Pinch 8 12   2pt Pinch 6 11       Manual Muscle Test   2/5/2024 2/5/2024    Right Left   Wrist Extension  4/5 4/5   Wrist Flexion 4/5 4/5         Special Tests  Thumb CMC Grind Test    Finkelstein's Test    Phalen's Test    Tinel's Test    Rolo's Test    Madison-Littler Test    Digital Collateral Stress Test    ORL Test    Froment's Sign    Pinch OK Sign    Janessa's Sign     Egawa Sign     Clamp Sign     SL Ballottement Test    LT Ballottement Test    Scaphoid/WatsonTest    Linscheid's Test    Metacarpal Stress Test    Piano Key Test    ECU Synergy Test    Ulnar Compression Test    TFCC Load Test    Ulnocarpal Stress Test    Midcarpal Shift Test    Pisiform Boost Test    Elbow Flexion Test    Scratch Collapse Test    Tennis Elbow Test    Resisted Middle Finger Extension Test    Mills Test    Chair Test    Biceps Squeeze Test    Biceps Hook Test    Milking Test    Press Up Manuever    PLRI Test    Valgus Stress Test    Varus Stress Test    Spurling Test    Cervical Distraction Test    ULNTT - General    ULNTT - Median Nerve    ULNTT - Radial Nerve    ULNTT - Ulnar Nerve         CMS Impairment/Limitation/Restriction for FOTO  Survey    Therapist reviewed FOTO scores for Adriana Deleon on 2/5/2024.   FOTO documents entered into [employer] - see Media section.    Limitation Score: 44%         Treatment     Treatment Time In: 4:59  Treatment Time Out: 5:30      ADRIANA received the following supervised modalities after being cleared for  contradictions for  minutes:   -N/A    ADRIANA received the following manual therapy techniques for  minutes:   -N/A    ADRIANA received therapeutic exercises for  minutes including:  -N/A    Home Exercise Program/Education:  Issued HEP (see patient instructions in EMR) and educated on modality use for pain management . Exercises were reviewed and ADRIANA was able to demonstrate them prior to the end of the session.   Pt received a written copy of exercises to perform at home. ADRIANA demonstrated good  understanding of the education provided.  Pt was advised to perform these exercises free of pain, and to stop performing them if pain occurs.    Patient/Family Education: role of OT, goals for OT, scheduling/cancellations - pt verbalized understanding. Discussed insurance limitations with patient.    Additional Education provided:     Assessment     Adriana Deleon is a 33 y.o. female referred to outpatient occupational therapy and presents with a medical diagnosis of bilateral carpal tunnel syndrome, resulting in decreased ROM/strength with increased pain and consistent numbness/tingling and demonstrates limitations as described above. Following medical record review it is determined that pt will benefit from occupational therapy services in order to maximize pain free and/or functional use of bilateral UE. The following goals were discussed with the patient and patient is in agreement with them as to be addressed in the treatment plan. The patient's rehab potential is Fair.     Anticipated barriers to occupational therapy:   Pt has no cultural, educational or language barriers to learning provided.        Goals:   The following goals were discussed with the patient and patient is in agreement with them as to be addressed in the treatment plan.   Short term Goals:  1) Initiate HEP  2) Pt will increase AROM of BUE by 5-10 degrees in order to assist with functional full fist by 4 weeks.  3) Pt will reduce edema by .5-1 cm  in affected fingers by 4 weeks.  4) Pt will reduce pain to less than 4/10 by 4 weeks.  5) Pt will increase functional  strength by 5# in order to A in opening containers for med management or home management tasks by 4 weeks.   6) Patient will be able to achieve less than or equal to 44% on the FOTO, demonstrating overall improved functional ability with upper extremity. (Self-care category)    Long Term Goals:  Goals to be met by discharge:  1) Independent with HEP  2) Pt will increase BUE PENA by 20-30 degrees in order to increase functional fist for grasp with home management or work related tasks by d/c.   3) Pt will decrease edema to trace or none to increase functional ROM by d/c.   4) Pt will decrease pain to trace or none while completing light home management tasks or work related tasks by d/c.   5) Patient will be able to achieve less than or equal to 60% on the FOTO, demonstrating overall improved functional ability with upper extremity.  (Self-care category)        Plan   Certification Period/Plan of care expiration: 2/5/2024 to 4/1/2024.    Outpatient Occupational Therapy 2 times weekly for 8 weeks to include the following interventions: Paraffin, Fluidotherapy, Manual therapy/joint mobilizations, Modalities for pain management, US 3 mhz, Therapeutic exercises/activities., Iontophoresis with 2.0 cc Dexamethasone, Strengthening, Orthotic Fabrication/Fit/Training, Electrical Modalities, Joint Protection, Energy Conservation, and Therapeutic activity.      KAITLIN ANDREWS OT M.D. signature__________________________________________  Date:_____________________________________________

## 2024-02-07 ENCOUNTER — TELEPHONE (OUTPATIENT)
Dept: EMERGENCY MEDICINE | Facility: HOSPITAL | Age: 34
End: 2024-02-07
Payer: MEDICAID
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
## 2024-04-17 ENCOUNTER — OFFICE VISIT (OUTPATIENT)
Dept: OTOLARYNGOLOGY | Facility: CLINIC | Age: 34
End: 2024-04-17
Payer: MEDICAID

## 2024-04-17 VITALS — BODY MASS INDEX: 50.02 KG/M2 | WEIGHT: 293 LBS | HEIGHT: 64 IN

## 2024-04-17 DIAGNOSIS — H65.23 BILATERAL CHRONIC SEROUS OTITIS MEDIA: Primary | ICD-10-CM

## 2024-04-17 DIAGNOSIS — J35.01 CHRONIC TONSILLITIS: ICD-10-CM

## 2024-04-17 DIAGNOSIS — H69.92 ETD (EUSTACHIAN TUBE DYSFUNCTION), LEFT: ICD-10-CM

## 2024-04-17 DIAGNOSIS — H92.02 OTALGIA, LEFT: ICD-10-CM

## 2024-04-17 PROCEDURE — 99204 OFFICE O/P NEW MOD 45 MIN: CPT | Mod: S$PBB,,, | Performed by: OTOLARYNGOLOGY

## 2024-04-17 PROCEDURE — 3008F BODY MASS INDEX DOCD: CPT | Mod: CPTII,,, | Performed by: OTOLARYNGOLOGY

## 2024-04-17 PROCEDURE — 1159F MED LIST DOCD IN RCRD: CPT | Mod: CPTII,,, | Performed by: OTOLARYNGOLOGY

## 2024-04-17 PROCEDURE — 1160F RVW MEDS BY RX/DR IN RCRD: CPT | Mod: CPTII,,, | Performed by: OTOLARYNGOLOGY

## 2024-04-17 PROCEDURE — 99213 OFFICE O/P EST LOW 20 MIN: CPT | Mod: PBBFAC | Performed by: OTOLARYNGOLOGY

## 2024-04-17 RX ORDER — FLUTICASONE PROPIONATE 50 MCG
2 SPRAY, SUSPENSION (ML) NASAL DAILY
Qty: 16 G | Refills: 2 | Status: SHIPPED | OUTPATIENT
Start: 2024-04-17

## 2024-04-17 RX ORDER — AMOXICILLIN 500 MG/1
500 CAPSULE ORAL 2 TIMES DAILY
Qty: 28 CAPSULE | Refills: 0 | Status: SHIPPED | OUTPATIENT
Start: 2024-04-17 | End: 2024-04-25

## 2024-04-17 NOTE — PROGRESS NOTES
Subjective:       Patient ID: Adriana Deleon is a 33 y.o. female.    Chief Complaint: Ear Fullness (Patient complains of her left ear being stopped up. She states it feels like it has water in it.) and Otalgia (Patient complains of her left ear hurting.)    Ear Fullness   Associated symptoms include hearing loss.   Otalgia   Associated symptoms include hearing loss.     Review of Systems   HENT:  Positive for ear pain and hearing loss.    All other systems reviewed and are negative.      Objective:      Physical Exam  General: NAD  Head: Normocephalic, atraumatic, no facial asymmetry/normal strength,  Ears: Both auricules normal in appearance, w/o deformities tympanic membranes dull ? Some fluid external auditory canals normal  Nose: External nose w/o deformities normal turbinates no drainage or inflammation  Oral Cavity: Lips, gums, floor of mouth, tongue hard palate, and buccal mucosa without mass/lesion 4 + tonsils  Oropharynx: Mucosa pink and moist, soft palate, posterior pharynx and oropharyngeal wall without mass/lesion  Neck: Supple, symmetric, trachea midline, no palpable mass/lesion, no palpable cervical lymphadenopathy  Skin: Warm and dry, no concerning lesions  Respiratory: Respirations even, unlabored  Assessment:       1. Bilateral chronic serous otitis media    2. Otalgia, left    3. Chronic tonsillitis    4. ETD (Eustachian tube dysfunction), left        Plan:       Amoxil  flonase  F/u 2 weeks

## 2024-04-25 ENCOUNTER — OFFICE VISIT (OUTPATIENT)
Dept: FAMILY MEDICINE | Facility: CLINIC | Age: 34
End: 2024-04-25
Payer: COMMERCIAL

## 2024-04-25 VITALS
BODY MASS INDEX: 50.02 KG/M2 | OXYGEN SATURATION: 98 % | SYSTOLIC BLOOD PRESSURE: 146 MMHG | HEIGHT: 64 IN | DIASTOLIC BLOOD PRESSURE: 72 MMHG | WEIGHT: 293 LBS | TEMPERATURE: 99 F | HEART RATE: 103 BPM

## 2024-04-25 DIAGNOSIS — E13.9 DIABETES MELLITUS OF OTHER TYPE WITHOUT COMPLICATION, UNSPECIFIED WHETHER LONG TERM INSULIN USE: ICD-10-CM

## 2024-04-25 DIAGNOSIS — L42 PITYRIASIS ROSEA: Primary | ICD-10-CM

## 2024-04-25 DIAGNOSIS — L25.9 CONTACT DERMATITIS, UNSPECIFIED CONTACT DERMATITIS TYPE, UNSPECIFIED TRIGGER: ICD-10-CM

## 2024-04-25 LAB — GLUCOSE SERPL-MCNC: 85 MG/DL (ref 70–110)

## 2024-04-25 PROCEDURE — 99051 MED SERV EVE/WKEND/HOLIDAY: CPT | Mod: ,,, | Performed by: NURSE PRACTITIONER

## 2024-04-25 PROCEDURE — 99213 OFFICE O/P EST LOW 20 MIN: CPT | Mod: 25,,, | Performed by: NURSE PRACTITIONER

## 2024-04-25 PROCEDURE — 96372 THER/PROPH/DIAG INJ SC/IM: CPT | Mod: ,,, | Performed by: NURSE PRACTITIONER

## 2024-04-25 RX ORDER — DULAGLUTIDE 1.5 MG/.5ML
INJECTION, SOLUTION SUBCUTANEOUS
COMMUNITY
Start: 2023-12-11

## 2024-04-25 RX ORDER — TRIAMCINOLONE ACETONIDE 0.25 MG/G
CREAM TOPICAL
Qty: 80 G | Refills: 1 | Status: SHIPPED | OUTPATIENT
Start: 2024-04-25

## 2024-04-25 RX ORDER — DEXAMETHASONE SODIUM PHOSPHATE 4 MG/ML
4 INJECTION, SOLUTION INTRA-ARTICULAR; INTRALESIONAL; INTRAMUSCULAR; INTRAVENOUS; SOFT TISSUE
Status: COMPLETED | OUTPATIENT
Start: 2024-04-25 | End: 2024-04-25

## 2024-04-25 RX ORDER — PREDNISONE 10 MG/1
TABLET ORAL
Qty: 7 TABLET | Refills: 0 | Status: SHIPPED | OUTPATIENT
Start: 2024-04-25

## 2024-04-25 RX ADMIN — DEXAMETHASONE SODIUM PHOSPHATE 4 MG: 4 INJECTION, SOLUTION INTRA-ARTICULAR; INTRALESIONAL; INTRAMUSCULAR; INTRAVENOUS; SOFT TISSUE at 07:04

## 2024-04-25 NOTE — PROGRESS NOTES
"  Subjective:       Patient ID: Adriana Deleon is a 33 y.o. female.    Chief Complaint: Rash (States 1 week, face neck and chest . Pt states poss reaction from amoxacillin )    Presents to clinic as above. Rash on face is very pruritic. Used some new [place] product on face. Rash on torso is mildly itchy. Has been taking amoxicillin for an ear infection. Ear pain has resolved.       Review of Systems   Constitutional: Negative.    HENT:  Negative for congestion, ear discharge, ear pain and sinus pain.    Respiratory: Negative.     Cardiovascular: Negative.    Skin:  Positive for itching and rash.          Reviewed family, medical, surgical, and social history.    Objective:      BP (!) 146/72 (BP Location: Right forearm, Patient Position: Sitting, BP Method: Large (Manual))   Pulse 103   Temp 99.2 °F (37.3 °C) (Oral)   Ht 5' 4" (1.626 m)   Wt (!) 179.6 kg (396 lb)   LMP 04/03/2024 (Exact Date)   SpO2 98%   BMI 67.97 kg/m²   Physical Exam  Vitals and nursing note reviewed.   Constitutional:       General: She is not in acute distress.     Appearance: Normal appearance. She is not ill-appearing, toxic-appearing or diaphoretic.   HENT:      Head: Normocephalic.      Right Ear: Tympanic membrane, ear canal and external ear normal.      Left Ear: Tympanic membrane, ear canal and external ear normal.      Nose: Nose normal.      Mouth/Throat:      Mouth: Mucous membranes are moist.      Pharynx: No oropharyngeal exudate or posterior oropharyngeal erythema.   Cardiovascular:      Rate and Rhythm: Normal rate and regular rhythm.      Heart sounds: Normal heart sounds.   Pulmonary:      Effort: Pulmonary effort is normal.      Breath sounds: Normal breath sounds.   Musculoskeletal:      Cervical back: Normal range of motion and neck supple.   Skin:     General: Skin is warm and dry.      Capillary Refill: Capillary refill takes less than 2 seconds.      Findings: Rash present.      Comments: Raised 2-3 mm papular rash " that is erythremic and itchy on face. There is a different rash on Chest, back, abdomen and arms, that is annular with raised borders. Borders are hyper pigmented. Mild itching. No scabbing or exudate.    Neurological:      Mental Status: She is alert and oriented to person, place, and time.   Psychiatric:         Mood and Affect: Mood normal.         Behavior: Behavior normal.         Thought Content: Thought content normal.         Judgment: Judgment normal.            Office Visit on 04/25/2024   Component Date Value Ref Range Status    POC Glucose 04/25/2024 85  70 - 110 MG/DL Final      Assessment:       1. Pityriasis rosea    2. Contact dermatitis, unspecified contact dermatitis type, unspecified trigger    3. Diabetes mellitus of other type without complication, unspecified whether long term insulin use        Plan:       Pityriasis rosea  -     dexAMETHasone injection 4 mg  -     predniSONE (DELTASONE) 10 MG tablet; Take 2 po daily for 2 days. Then,1  po daily until gone.  Dispense: 7 tablet; Refill: 0    Contact dermatitis, unspecified contact dermatitis type, unspecified trigger  -     dexAMETHasone injection 4 mg  -     triamcinolone acetonide 0.025% (KENALOG) 0.025 % cream; Apply to facial rash bid prn itching.  Dispense: 80 g; Refill: 1  -     predniSONE (DELTASONE) 10 MG tablet; Take 2 po daily for 2 days. Then,1  po daily until gone.  Dispense: 7 tablet; Refill: 0    Diabetes mellitus of other type without complication, unspecified whether long term insulin use  -     POCT glucose    Discussed self limiting nature of Pityriasis Rosea. If rash still present in 2-3 weeks, notify me and I will refer to dermatology  Put PCN on allergy list for safety. Stop using [place] product.   RTC PRN          Risks, benefits, and side effects were discussed with the patient. All questions were answered to the fullest satisfaction of the patient, and pt verbalized understanding and agreement to treatment plan. Pt was to  call with any new or worsening symptoms, or present to the ER.

## 2024-06-13 ENCOUNTER — OFFICE VISIT (OUTPATIENT)
Dept: FAMILY MEDICINE | Facility: CLINIC | Age: 34
End: 2024-06-13
Payer: MEDICAID

## 2024-06-13 ENCOUNTER — OFFICE VISIT (OUTPATIENT)
Dept: OBSTETRICS AND GYNECOLOGY | Facility: CLINIC | Age: 34
End: 2024-06-13
Payer: MEDICAID

## 2024-06-13 VITALS
OXYGEN SATURATION: 100 % | BODY MASS INDEX: 65.23 KG/M2 | HEART RATE: 88 BPM | SYSTOLIC BLOOD PRESSURE: 132 MMHG | WEIGHT: 293 LBS | DIASTOLIC BLOOD PRESSURE: 72 MMHG

## 2024-06-13 DIAGNOSIS — E66.01 MORBID OBESITY WITH BMI OF 60.0-69.9, ADULT: ICD-10-CM

## 2024-06-13 DIAGNOSIS — N89.8 VAGINAL DISCHARGE: Primary | ICD-10-CM

## 2024-06-13 DIAGNOSIS — H10.9 CONJUNCTIVITIS, UNSPECIFIED CONJUNCTIVITIS TYPE, UNSPECIFIED LATERALITY: Primary | ICD-10-CM

## 2024-06-13 LAB
CANDIDA SPECIES: NEGATIVE
GARDNERELLA: POSITIVE
TRICHOMONAS: NEGATIVE

## 2024-06-13 PROCEDURE — 3008F BODY MASS INDEX DOCD: CPT | Mod: CPTII,,, | Performed by: ADVANCED PRACTICE MIDWIFE

## 2024-06-13 PROCEDURE — 87491 CHLMYD TRACH DNA AMP PROBE: CPT | Mod: ,,, | Performed by: CLINICAL MEDICAL LABORATORY

## 2024-06-13 PROCEDURE — 1159F MED LIST DOCD IN RCRD: CPT | Mod: CPTII,,,

## 2024-06-13 PROCEDURE — 87591 N.GONORRHOEAE DNA AMP PROB: CPT | Mod: ,,, | Performed by: CLINICAL MEDICAL LABORATORY

## 2024-06-13 PROCEDURE — 3078F DIAST BP <80 MM HG: CPT | Mod: CPTII,,, | Performed by: ADVANCED PRACTICE MIDWIFE

## 2024-06-13 PROCEDURE — 87660 TRICHOMONAS VAGIN DIR PROBE: CPT | Mod: ,,, | Performed by: CLINICAL MEDICAL LABORATORY

## 2024-06-13 PROCEDURE — 99213 OFFICE O/P EST LOW 20 MIN: CPT | Mod: ,,,

## 2024-06-13 PROCEDURE — 3075F SYST BP GE 130 - 139MM HG: CPT | Mod: CPTII,,, | Performed by: ADVANCED PRACTICE MIDWIFE

## 2024-06-13 PROCEDURE — 99213 OFFICE O/P EST LOW 20 MIN: CPT | Mod: ,,, | Performed by: ADVANCED PRACTICE MIDWIFE

## 2024-06-13 PROCEDURE — 87480 CANDIDA DNA DIR PROBE: CPT | Mod: ,,, | Performed by: CLINICAL MEDICAL LABORATORY

## 2024-06-13 PROCEDURE — 1160F RVW MEDS BY RX/DR IN RCRD: CPT | Mod: CPTII,,,

## 2024-06-13 PROCEDURE — 87510 GARDNER VAG DNA DIR PROBE: CPT | Mod: ,,, | Performed by: CLINICAL MEDICAL LABORATORY

## 2024-06-13 PROCEDURE — 1159F MED LIST DOCD IN RCRD: CPT | Mod: CPTII,,, | Performed by: ADVANCED PRACTICE MIDWIFE

## 2024-06-13 RX ORDER — OFLOXACIN 3 MG/ML
1 SOLUTION/ DROPS OPHTHALMIC 4 TIMES DAILY
Qty: 10 ML | Refills: 0 | Status: SHIPPED | OUTPATIENT
Start: 2024-06-13

## 2024-06-13 NOTE — PROGRESS NOTES
Subjective     Patient ID: Adriana Deleon is a 33 y.o. female.    Chief Complaint: No chief complaint on file.    Presents to clinic for evaluation of eye redness/itching.     Review of Systems   Constitutional:  Negative for chills and fever.   HENT:  Negative for facial swelling.    Eyes:  Positive for photophobia, pain, discharge, redness and itching.   Musculoskeletal:  Negative for neck pain and neck stiffness.   Neurological:  Negative for weakness and headaches.          Objective     Physical Exam  Vitals and nursing note reviewed.   Constitutional:       Appearance: Normal appearance. She is obese.   Eyes:      General: Lids are normal. Lids are everted, no foreign bodies appreciated. Vision grossly intact. Gaze aligned appropriately. Allergic shiner present.      Conjunctiva/sclera:      Right eye: Right conjunctiva is injected.      Left eye: Left conjunctiva is injected.   Cardiovascular:      Rate and Rhythm: Normal rate and regular rhythm.      Pulses: Normal pulses.      Heart sounds: Normal heart sounds.   Pulmonary:      Effort: Pulmonary effort is normal.      Breath sounds: Normal breath sounds.   Abdominal:      General: Abdomen is flat. Bowel sounds are normal.      Palpations: Abdomen is soft.   Musculoskeletal:         General: Normal range of motion.      Cervical back: Normal range of motion and neck supple.   Skin:     General: Skin is warm and dry.      Capillary Refill: Capillary refill takes less than 2 seconds.   Neurological:      General: No focal deficit present.      Mental Status: She is alert and oriented to person, place, and time.   Psychiatric:         Mood and Affect: Mood normal.         Behavior: Behavior normal.         Thought Content: Thought content normal.         Judgment: Judgment normal.          Assessment and Plan     1. Conjunctivitis, unspecified conjunctivitis type, unspecified laterality  -     ofloxacin (OCUFLOX) 0.3 % ophthalmic solution; Place 1 drop into  both eyes 4 (four) times daily.  Dispense: 10 mL; Refill: 0        Wash hands before and after using drops, avoid rubbing/touching eyes, cool damp wash cloths to eyes to help with discomfort.          Follow up if symptoms worsen or fail to improve.    I spent a total of 15 minutes on the day of the visit.This includes face to face time and non-face to face time preparing to see the patient (eg, review of tests), obtaining and/or reviewing separately obtained history, documenting clinical information in the electronic or other health record, independently interpreting results and communicating results to the patient/family/caregiver, or care coordinator.    SRIDHAR Fabian

## 2024-06-13 NOTE — PROGRESS NOTES
Patient ID:  Adriana Deleon is a 33 y.o. female      Chief Complaint:   Chief Complaint   Patient presents with    Vaginal Discharge    Vaginal Itching        HPI:  Adriana is in with c/o odorous vaginal discharge x 3 weeks. Has tried boric acid vaginal suppositories which caused irritation. Denies abd/pelvic pain and vaginal bleeding.   LMP: Patient's last menstrual period was 2024. Monthly lasting 5-6 days, normal flow  Sexually active:  denies recent encounter, agrees to STD testing  Contraceptive: none      Past Medical History:   Diagnosis Date    Abnormal uterine bleeding     Amenorrhea     Anemia     Depression     Diabetes mellitus, type 2     Dysmenorrhea     NENITA (generalized anxiety disorder)     H/O: depression     Hypertension     Infertility, female     PID (pelvic inflammatory disease)     Sleep apnea, obstructive     STD (sexually transmitted disease)      Past Surgical History:   Procedure Laterality Date     SECTION  2017    [place] [facility] [place]          1    Para   1    Term                AB        Living   1         SAB        IAB        Ectopic        Multiple        Live Births   1                 /72   Pulse 88   Wt (!) 172.4 kg (380 lb)   LMP 2024   SpO2 100%   BMI 65.23 kg/m²   Wt Readings from Last 3 Encounters:   24 (!) 172.4 kg (380 lb)   24 (!) 179.6 kg (396 lb)   24 (!) 176 kg (388 lb)          ROS:  Review of Systems   Constitutional: Negative.    Eyes: Negative.    Respiratory: Negative.     Cardiovascular: Negative.    Gastrointestinal: Negative.    Genitourinary:  Positive for vaginal discharge and vaginal odor.   Musculoskeletal: Negative.    Neurological: Negative.    Psychiatric/Behavioral: Negative.            PHYSICAL EXAM:  Physical Exam  Constitutional:       Appearance: Normal appearance. She is obese.   Eyes:      Extraocular Movements: Extraocular movements intact.   Cardiovascular:      Rate  and Rhythm: Normal rate.      Pulses: Normal pulses.   Pulmonary:      Effort: Pulmonary effort is normal.   Musculoskeletal:         General: Normal range of motion.      Cervical back: Normal range of motion.   Skin:     General: Skin is warm and dry.   Neurological:      Mental Status: She is alert and oriented to person, place, and time.   Psychiatric:         Mood and Affect: Mood normal.         Behavior: Behavior normal.         Thought Content: Thought content normal.         Judgment: Judgment normal.          Assessment:  Adriana was seen today for vaginal discharge and vaginal itching.    Diagnoses and all orders for this visit:    Vaginal discharge  -     Bacterial Vaginosis; Future  -     Chlamydia/GC, PCR; Future  -     Bacterial Vaginosis  -     Chlamydia/GC, PCR    Morbid obesity with BMI of 60.0-69.9, adult          ICD-10-CM ICD-9-CM    1. Vaginal discharge  N89.8 623.5 Bacterial Vaginosis      Chlamydia/GC, PCR      Bacterial Vaginosis      Chlamydia/GC, PCR      2. Morbid obesity with BMI of 60.0-69.9, adult  E66.01 278.01     Z68.44 V85.44           Plan:  Affirm swab self collected  Urine send for GC/CT testing  Will call or send My Chart message after results reviewed  Encouraged use of condoms with each sexual encounter to decrease STD exposure risk  Discussed vaginitis prevention measures including use of sensitive skin/ fragrance free body care products    Follow up for care as needed.

## 2024-06-14 DIAGNOSIS — N76.0 BACTERIAL VAGINAL INFECTION: Primary | ICD-10-CM

## 2024-06-14 DIAGNOSIS — B96.89 BACTERIAL VAGINAL INFECTION: Primary | ICD-10-CM

## 2024-06-14 LAB
CHLAMYDIA BY PCR: NEGATIVE
N. GONORRHOEAE (GC) BY PCR: NEGATIVE

## 2024-06-14 RX ORDER — METRONIDAZOLE 500 MG/1
500 TABLET ORAL 2 TIMES DAILY
Qty: 14 TABLET | Refills: 0 | Status: SHIPPED | OUTPATIENT
Start: 2024-06-14 | End: 2024-06-21

## 2024-06-14 RX ORDER — FLUCONAZOLE 150 MG/1
150 TABLET ORAL
Qty: 2 TABLET | Refills: 0 | Status: SHIPPED | OUTPATIENT
Start: 2024-06-14 | End: 2024-06-22